# Patient Record
Sex: FEMALE | Race: WHITE | NOT HISPANIC OR LATINO | Employment: UNEMPLOYED | URBAN - NONMETROPOLITAN AREA
[De-identification: names, ages, dates, MRNs, and addresses within clinical notes are randomized per-mention and may not be internally consistent; named-entity substitution may affect disease eponyms.]

---

## 2017-05-02 ENCOUNTER — TRANSCRIBE ORDERS (OUTPATIENT)
Dept: ADMINISTRATIVE | Facility: HOSPITAL | Age: 42
End: 2017-05-02

## 2017-05-02 ENCOUNTER — HOSPITAL ENCOUNTER (OUTPATIENT)
Dept: MAMMOGRAPHY | Facility: HOSPITAL | Age: 42
Discharge: HOME | End: 2017-05-02
Attending: OBSTETRICS & GYNECOLOGY | Admitting: OBSTETRICS & GYNECOLOGY

## 2017-05-02 DIAGNOSIS — Z12.31 ENCOUNTER FOR SCREENING MAMMOGRAM FOR MALIGNANT NEOPLASM OF BREAST: Primary | ICD-10-CM

## 2017-05-02 DIAGNOSIS — Z12.31 ENCOUNTER FOR SCREENING MAMMOGRAM FOR MALIGNANT NEOPLASM OF BREAST: ICD-10-CM

## 2017-05-02 PROCEDURE — G0202 SCR MAMMO BI INCL CAD: HCPCS

## 2017-05-02 PROCEDURE — 77063 BREAST TOMOSYNTHESIS BI: CPT

## 2017-05-03 ENCOUNTER — TRANSCRIBE ORDERS (OUTPATIENT)
Dept: ADMINISTRATIVE | Facility: HOSPITAL | Age: 42
End: 2017-05-03

## 2017-05-03 DIAGNOSIS — N63.0 BREAST NODULE: Primary | ICD-10-CM

## 2017-05-05 ENCOUNTER — APPOINTMENT (OUTPATIENT)
Dept: ULTRASOUND IMAGING | Facility: HOSPITAL | Age: 42
End: 2017-05-05
Attending: OBSTETRICS & GYNECOLOGY

## 2017-05-09 ENCOUNTER — HOSPITAL ENCOUNTER (OUTPATIENT)
Dept: ULTRASOUND IMAGING | Facility: HOSPITAL | Age: 42
Discharge: HOME | End: 2017-05-09
Attending: OBSTETRICS & GYNECOLOGY | Admitting: OBSTETRICS & GYNECOLOGY

## 2017-05-09 DIAGNOSIS — N63.0 BREAST NODULE: ICD-10-CM

## 2017-05-09 PROCEDURE — 76642 ULTRASOUND BREAST LIMITED: CPT

## 2020-08-14 ENCOUNTER — APPOINTMENT (OUTPATIENT)
Dept: CT IMAGING | Facility: HOSPITAL | Age: 45
End: 2020-08-14

## 2020-08-14 ENCOUNTER — APPOINTMENT (OUTPATIENT)
Dept: GENERAL RADIOLOGY | Facility: HOSPITAL | Age: 45
End: 2020-08-14

## 2020-08-14 ENCOUNTER — HOSPITAL ENCOUNTER (INPATIENT)
Facility: HOSPITAL | Age: 45
LOS: 2 days | Discharge: HOME | End: 2020-08-16
Attending: EMERGENCY MEDICINE | Admitting: ORTHOPAEDIC SURGERY

## 2020-08-14 DIAGNOSIS — S82.392A CLOSED INTRA-ARTICULAR FRACTURE OF DISTAL TIBIA, LEFT, INITIAL ENCOUNTER: ICD-10-CM

## 2020-08-14 DIAGNOSIS — S82.301A CLOSED FRACTURE OF DISTAL END OF RIGHT TIBIA, UNSPECIFIED FRACTURE MORPHOLOGY, INITIAL ENCOUNTER: ICD-10-CM

## 2020-08-14 DIAGNOSIS — Z78.9 DECREASED ACTIVITIES OF DAILY LIVING (ADL): ICD-10-CM

## 2020-08-14 DIAGNOSIS — S82.841A CLOSED BIMALLEOLAR FRACTURE OF RIGHT ANKLE, INITIAL ENCOUNTER: Primary | ICD-10-CM

## 2020-08-14 DIAGNOSIS — Z74.09 IMPAIRED MOBILITY: ICD-10-CM

## 2020-08-14 LAB
ANION GAP SERPL CALCULATED.3IONS-SCNC: 13 MMOL/L (ref 5–15)
APTT PPP: 28.9 SECONDS (ref 24.1–35)
BASOPHILS # BLD AUTO: 0.07 10*3/MM3 (ref 0–0.2)
BASOPHILS NFR BLD AUTO: 0.7 % (ref 0–1.5)
BUN SERPL-MCNC: 10 MG/DL (ref 6–20)
BUN/CREAT SERPL: 12.5 (ref 7–25)
CALCIUM SPEC-SCNC: 9.5 MG/DL (ref 8.6–10.5)
CHLORIDE SERPL-SCNC: 100 MMOL/L (ref 98–107)
CO2 SERPL-SCNC: 26 MMOL/L (ref 22–29)
CREAT SERPL-MCNC: 0.8 MG/DL (ref 0.57–1)
DEPRECATED RDW RBC AUTO: 45.2 FL (ref 37–54)
EOSINOPHIL # BLD AUTO: 0.02 10*3/MM3 (ref 0–0.4)
EOSINOPHIL NFR BLD AUTO: 0.2 % (ref 0.3–6.2)
ERYTHROCYTE [DISTWIDTH] IN BLOOD BY AUTOMATED COUNT: 13.9 % (ref 12.3–15.4)
GFR SERPL CREATININE-BSD FRML MDRD: 78 ML/MIN/1.73
GLUCOSE SERPL-MCNC: 140 MG/DL (ref 65–99)
HCT VFR BLD AUTO: 37.1 % (ref 34–46.6)
HGB BLD-MCNC: 12.6 G/DL (ref 12–15.9)
IMM GRANULOCYTES # BLD AUTO: 0.02 10*3/MM3 (ref 0–0.05)
IMM GRANULOCYTES NFR BLD AUTO: 0.2 % (ref 0–0.5)
INR PPP: 1.08 (ref 0.91–1.09)
LYMPHOCYTES # BLD AUTO: 1.21 10*3/MM3 (ref 0.7–3.1)
LYMPHOCYTES NFR BLD AUTO: 12.5 % (ref 19.6–45.3)
MCH RBC QN AUTO: 29.9 PG (ref 26.6–33)
MCHC RBC AUTO-ENTMCNC: 34 G/DL (ref 31.5–35.7)
MCV RBC AUTO: 88.1 FL (ref 79–97)
MONOCYTES # BLD AUTO: 0.9 10*3/MM3 (ref 0.1–0.9)
MONOCYTES NFR BLD AUTO: 9.3 % (ref 5–12)
NEUTROPHILS NFR BLD AUTO: 7.48 10*3/MM3 (ref 1.7–7)
NEUTROPHILS NFR BLD AUTO: 77.1 % (ref 42.7–76)
NRBC BLD AUTO-RTO: 0 /100 WBC (ref 0–0.2)
PLATELET # BLD AUTO: 244 10*3/MM3 (ref 140–450)
PMV BLD AUTO: 11.7 FL (ref 6–12)
POTASSIUM SERPL-SCNC: 3.7 MMOL/L (ref 3.5–5.2)
PROTHROMBIN TIME: 13.6 SECONDS (ref 11.9–14.6)
RBC # BLD AUTO: 4.21 10*6/MM3 (ref 3.77–5.28)
SARS-COV-2 RDRP RESP QL NAA+PROBE: NOT DETECTED
SODIUM SERPL-SCNC: 139 MMOL/L (ref 136–145)
WBC # BLD AUTO: 9.7 10*3/MM3 (ref 3.4–10.8)

## 2020-08-14 PROCEDURE — 87635 SARS-COV-2 COVID-19 AMP PRB: CPT | Performed by: EMERGENCY MEDICINE

## 2020-08-14 PROCEDURE — 85730 THROMBOPLASTIN TIME PARTIAL: CPT | Performed by: EMERGENCY MEDICINE

## 2020-08-14 PROCEDURE — 85025 COMPLETE CBC W/AUTO DIFF WBC: CPT | Performed by: EMERGENCY MEDICINE

## 2020-08-14 PROCEDURE — 73610 X-RAY EXAM OF ANKLE: CPT

## 2020-08-14 PROCEDURE — 97116 GAIT TRAINING THERAPY: CPT

## 2020-08-14 PROCEDURE — 25010000002 MORPHINE PER 10 MG: Performed by: EMERGENCY MEDICINE

## 2020-08-14 PROCEDURE — 99284 EMERGENCY DEPT VISIT MOD MDM: CPT

## 2020-08-14 PROCEDURE — 25010000002 ONDANSETRON PER 1 MG: Performed by: EMERGENCY MEDICINE

## 2020-08-14 PROCEDURE — 97165 OT EVAL LOW COMPLEX 30 MIN: CPT

## 2020-08-14 PROCEDURE — 73590 X-RAY EXAM OF LOWER LEG: CPT

## 2020-08-14 PROCEDURE — 80048 BASIC METABOLIC PNL TOTAL CA: CPT | Performed by: EMERGENCY MEDICINE

## 2020-08-14 PROCEDURE — 25010000003 HYDROMORPHONE 1 MG/ML SOLUTION: Performed by: EMERGENCY MEDICINE

## 2020-08-14 PROCEDURE — 63710000001 PROMETHAZINE PER 25 MG: Performed by: PHYSICIAN ASSISTANT

## 2020-08-14 PROCEDURE — 73700 CT LOWER EXTREMITY W/O DYE: CPT

## 2020-08-14 PROCEDURE — 97161 PT EVAL LOW COMPLEX 20 MIN: CPT | Performed by: PHYSICAL THERAPIST

## 2020-08-14 PROCEDURE — 85610 PROTHROMBIN TIME: CPT | Performed by: EMERGENCY MEDICINE

## 2020-08-14 RX ORDER — CLONIDINE HYDROCHLORIDE 0.1 MG/1
0.1 TABLET ORAL NIGHTLY
COMMUNITY

## 2020-08-14 RX ORDER — LEVOTHYROXINE AND LIOTHYRONINE 38; 9 UG/1; UG/1
60 TABLET ORAL NIGHTLY
COMMUNITY

## 2020-08-14 RX ORDER — RISPERIDONE 2 MG/1
2 TABLET ORAL NIGHTLY
COMMUNITY

## 2020-08-14 RX ORDER — PROMETHAZINE HYDROCHLORIDE 25 MG/1
12.5 TABLET ORAL EVERY 6 HOURS PRN
Status: DISCONTINUED | OUTPATIENT
Start: 2020-08-14 | End: 2020-08-16 | Stop reason: HOSPADM

## 2020-08-14 RX ORDER — DULOXETIN HYDROCHLORIDE 60 MG/1
60 CAPSULE, DELAYED RELEASE ORAL NIGHTLY
COMMUNITY

## 2020-08-14 RX ORDER — DICLOFENAC SODIUM AND MISOPROSTOL 75; 200 MG/1; UG/1
1 TABLET, DELAYED RELEASE ORAL NIGHTLY
COMMUNITY

## 2020-08-14 RX ORDER — ONDANSETRON 2 MG/ML
4 INJECTION INTRAMUSCULAR; INTRAVENOUS ONCE
Status: COMPLETED | OUTPATIENT
Start: 2020-08-14 | End: 2020-08-14

## 2020-08-14 RX ORDER — MORPHINE SULFATE 10 MG/ML
6 INJECTION INTRAMUSCULAR; INTRAVENOUS; SUBCUTANEOUS EVERY 4 HOURS PRN
Status: COMPLETED | OUTPATIENT
Start: 2020-08-14 | End: 2020-08-14

## 2020-08-14 RX ORDER — ONDANSETRON 2 MG/ML
4 INJECTION INTRAMUSCULAR; INTRAVENOUS EVERY 6 HOURS PRN
Status: DISCONTINUED | OUTPATIENT
Start: 2020-08-14 | End: 2020-08-16 | Stop reason: HOSPADM

## 2020-08-14 RX ORDER — METOPROLOL TARTRATE 50 MG/1
50 TABLET, FILM COATED ORAL NIGHTLY
COMMUNITY

## 2020-08-14 RX ORDER — NALOXONE HCL 0.4 MG/ML
0.4 VIAL (ML) INJECTION
Status: DISCONTINUED | OUTPATIENT
Start: 2020-08-14 | End: 2020-08-16 | Stop reason: HOSPADM

## 2020-08-14 RX ORDER — OLANZAPINE 20 MG/1
20 TABLET ORAL NIGHTLY
COMMUNITY

## 2020-08-14 RX ORDER — SODIUM CHLORIDE, SODIUM LACTATE, POTASSIUM CHLORIDE, CALCIUM CHLORIDE 600; 310; 30; 20 MG/100ML; MG/100ML; MG/100ML; MG/100ML
100 INJECTION, SOLUTION INTRAVENOUS CONTINUOUS
Status: DISCONTINUED | OUTPATIENT
Start: 2020-08-14 | End: 2020-08-16 | Stop reason: HOSPADM

## 2020-08-14 RX ORDER — CYCLOBENZAPRINE HCL 10 MG
10 TABLET ORAL NIGHTLY
COMMUNITY

## 2020-08-14 RX ORDER — OXYCODONE AND ACETAMINOPHEN 7.5; 325 MG/1; MG/1
1 TABLET ORAL EVERY 4 HOURS PRN
Status: DISCONTINUED | OUTPATIENT
Start: 2020-08-14 | End: 2020-08-16 | Stop reason: HOSPADM

## 2020-08-14 RX ADMIN — PROMETHAZINE HYDROCHLORIDE 12.5 MG: 25 TABLET ORAL at 15:16

## 2020-08-14 RX ADMIN — ONDANSETRON HYDROCHLORIDE 4 MG: 2 SOLUTION INTRAMUSCULAR; INTRAVENOUS at 08:09

## 2020-08-14 RX ADMIN — SODIUM CHLORIDE, POTASSIUM CHLORIDE, SODIUM LACTATE AND CALCIUM CHLORIDE 100 ML/HR: 600; 310; 30; 20 INJECTION, SOLUTION INTRAVENOUS at 08:09

## 2020-08-14 RX ADMIN — HYDROMORPHONE HYDROCHLORIDE 1 MG: 1 INJECTION, SOLUTION INTRAMUSCULAR; INTRAVENOUS; SUBCUTANEOUS at 03:32

## 2020-08-14 RX ADMIN — OXYCODONE HYDROCHLORIDE AND ACETAMINOPHEN 1 TABLET: 7.5; 325 TABLET ORAL at 15:16

## 2020-08-14 RX ADMIN — MORPHINE SULFATE 6 MG: 10 INJECTION, SOLUTION INTRAMUSCULAR; INTRAVENOUS at 08:09

## 2020-08-14 RX ADMIN — PROMETHAZINE HYDROCHLORIDE 12.5 MG: 25 TABLET ORAL at 21:25

## 2020-08-14 RX ADMIN — ONDANSETRON HYDROCHLORIDE 4 MG: 2 SOLUTION INTRAMUSCULAR; INTRAVENOUS at 16:48

## 2020-08-14 RX ADMIN — ONDANSETRON HYDROCHLORIDE 4 MG: 2 SOLUTION INTRAMUSCULAR; INTRAVENOUS at 03:32

## 2020-08-14 RX ADMIN — MORPHINE SULFATE 6 MG: 10 INJECTION, SOLUTION INTRAMUSCULAR; INTRAVENOUS at 21:25
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
## 2020-08-14 NOTE — THERAPY TREATMENT NOTE
Acute Care - Physical Therapy Treatment Note  [facility]     Patient Name: Hilda Cantrell  : 1975  MRN: [id number]  Today's Date: 2020  Onset of Illness/Injury or Date of Surgery: 20     Referring Physician: Dr. Lewis    Admit Date: 2020    Visit Dx:    ICD-10-CM ICD-9-CM   1. Closed bimalleolar fracture of right ankle, initial encounter S82.841A 824.4   2. Closed fracture of distal end of right tibia, unspecified fracture morphology, initial encounter S82.301A 824.8   3. Impaired mobility Z74.09 799.89   4. Decreased activities of daily living (ADL) Z78.9 V49.89     Patient Active Problem List   Diagnosis   • Closed bimalleolar fracture of right ankle   • Closed intra-articular fracture of distal tibia, left, initial encounter       Therapy Treatment    Rehabilitation Treatment Summary     Row Name 20 1413             Treatment Time/Intention    Discipline  physical therapy assistant  -      Document Type  therapy note (daily note)  -      Subjective Information  complains of;pain  -LC2      Mode of Treatment  physical therapy  -      Existing Precautions/Restrictions  fall;non-weight bearing NWB R LE  -LC2      Recorded by [LC] Eun Lindquist, PTA 20 1413  [LC2] Eun Lindquist, PTA 20 1451      Row Name 20 1413             Bed Mobility Assessment/Treatment    Supine-Sit [place] (Bed Mobility)  independent  -      Sit-Supine [place] (Bed Mobility)  independent  -LC      Recorded by [LC] Eun Lindquist, PTA 20 1451      Row Name 20 1413             Sit-Stand Transfer    Sit-Stand [place] (Transfers)  supervision  -      Assistive Device (Sit-Stand Transfers)  walker, front-wheeled  -      Recorded by [LC] Eun Lindquist, PTA 20 1451      Row Name 20 1413             Stand-Sit Transfer    Stand-Sit [place] (Transfers)  supervision  -      Assistive Device (Stand-Sit Transfers)  walker, front-wheeled  -       Recorded by [LC] Eun Lindquist, [facility] 08/14/20 1451      Row Name 08/14/20 1413             Gait/Stairs Assessment/Training    [place] Level (Gait)  verbal cues;contact guard  -      Assistive Device (Gait)  walker, front-wheeled  -LC      Distance in Feet (Gait)  15' hopping, unable to clear L foot enough to attempt at this time  -LC      Recorded by [LC] Eun Lindquist, [facility] 08/14/20 1451      Row Name 08/14/20 1413             Motor Skills Assessment/Interventions    Additional Documentation  Therapeutic Exercise (Group);Therapeutic Exercise Interventions (Group)  -LC      Recorded by [LC] Eun Lindquist, [facility] 08/14/20 1451      Row Name 08/14/20 1413             Therapeutic Exercise    Comment (Therapeutic Exercise)  able to complete 8 single leg squats with RWX  -LC      Recorded by [LC] Eun Lindquist, [facility] 08/14/20 1451      Row Name 08/14/20 1413             Positioning and Restraints    Pre-Treatment Position  in bed  -LC      Post Treatment Position  bed  -LC      In Bed  fowlers;call light within reach;encouraged to call for assist  -LC      Recorded by [LC] Eun Lindquist, [facility] 08/14/20 1451      Row Name 08/14/20 1413             Pain Scale: Numbers Pre/Post-Treatment    Pain Scale: Numbers, Pretreatment  7/10  -LC      Pain Scale: Numbers, Post-Treatment  7/10  -LC      Pain Location - Side  Right  -LC      Pain Location  ankle  -LC      Pain Intervention(s)  Medication (See MAR);Ambulation/increased activity  -      Recorded by [LC] Eun Lindquist, [facility] 08/14/20 1451        User Key  (r) = Recorded By, (t) = Taken By, (c) = Cosigned By    Initials Name Effective Dates Discipline     Eun Lindquist, [facility] 10/18/19 -  PT               Rehab Goal Summary     Row Name 08/14/20 1248 08/14/20 1110          Transfer Goal 1 (PT)    Activity/Assistive Device (Transfer Goal 1, PT)  sit-to-stand/stand-to-sit;bed-to-chair/chair-to-bed;walker, rolling  -MS  --     [place] Level/Cues Needed (Transfer Goal 1, PT)   conditional independence  -MS  --     Time Frame (Transfer Goal 1, PT)  long term goal (LTG);by discharge  -MS  --     Progress/Outcome (Transfer Goal 1, PT)  goal ongoing  -MS  --        Gait Training Goal 1 (PT)    Activity/Assistive Device (Gait Training Goal 1, PT)  gait (walking locomotion);assistive device use;decrease fall risk;increase endurance/gait distance;walker, rolling  -MS  --     [place] Level (Gait Training Goal 1, PT)  conditional independence  -MS  --     Distance (Gait Goal 1, PT)  100ft  -MS  --     Time Frame (Gait Training Goal 1, PT)  long term goal (LTG);by discharge  -MS  --     Progress/Outcome (Gait Training Goal 1, PT)  goal ongoing  -MS  --        Stairs Goal 1 (PT)    Activity/Assistive Device (Stairs Goal 1, PT)  ascending stairs;descending stairs;step-to-step;walker, rolling;crutches, axillary  -MS  --     [place] Level/Cues Needed (Stairs Goal 1, PT)  minimum assist (75% or more patient effort);tactile cues required;verbal cues required  -MS  --     Number of Stairs (Stairs Goal 1, PT)  4  -MS  --     Time Frame (Stairs Goal 1, PT)  long term goal (LTG);by discharge  -MS  --     Progress/Outcome (Stairs Goal 1, PT)  goal ongoing  -MS  --        Occupational Therapy Goals    Transfer Goal Selection (OT)  --  transfer, OT goal 1  -MW     Dressing Goal Selection (OT)  --  dressing, OT goal 1  -MW     Toileting Goal Selection (OT)  --  toileting, OT goal 1  -MW        Transfer Goal 1 (OT)    Activity/Assistive Device (Transfer Goal 1, OT)  --  sit-to-stand/stand-to-sit;bed-to-chair/chair-to-bed;toilet;shower chair  -MW     [place] Level/Cues Needed (Transfer Goal 1, OT)  --  conditional independence  -MW     Time Frame (Transfer Goal 1, OT)  --  long term goal (LTG);by discharge  -MW     Progress/Outcome (Transfer Goal 1, OT)  --  goal ongoing  -MW        Dressing Goal 1 (OT)    Activity/Assistive Device (Dressing Goal 1, OT)  --  lower body dressing  -MW      [place]/Cues Needed (Dressing Goal 1, OT)  --  minimum assist (75% or more patient effort)  -MW     Time Frame (Dressing Goal 1, OT)  --  long term goal (LTG);by discharge  -MW     Progress/Outcome (Dressing Goal 1, OT)  --  goal ongoing  -MW        Toileting Goal 1 (OT)    Activity/Device (Toileting Goal 1, OT)  --  toileting skills, all;commode  -MW     [place] Level/Cues Needed (Toileting Goal 1, OT)  --  minimum assist (75% or more patient effort)  -MW     Time Frame (Toileting Goal 1, OT)  --  long term goal (LTG);by discharge  -MW     Progress/Outcome (Toileting Goal 1, OT)  --  goal ongoing  -MW       User Key  (r) = Recorded By, (t) = Taken By, (c) = Cosigned By    Initials Name Provider Type Discipline    Stephanie Sanches, PT, DPT, NCS Physical Therapist PT    Neva Fatima, OTR/L Occupational Therapist OT          Physical Therapy Education                 Title: PT OT SLP Therapies (In Progress)     Topic: Physical Therapy (In Progress)     Point: Mobility training (Done)     Description:   Instruct learner(s) on safety and technique for assisting patient out of bed, chair or wheelchair.  Instruct in the proper use of assistive devices, such as walker, crutches, cane or brace.              Patient Friendly Description:   It's important to get you on your feet again, but we need to do so in a way that is safe for you. Falling has serious consequences, and your personal safety is the most important thing of all.        When it's time to get out of bed, one of us or a family member will sit next to you on the bed to give you support.     If your doctor or nurse tells you to use a walker, crutches, a cane, or a brace, be sure you use it every time you get out of bed, even if you think you don't need it.    Learning Progress Summary           Patient Acceptance, E, VU by MS at 8/14/2020 0904    Comment:  role of PT in her care, gait mechanics                   Point: Home exercise program  (Not Started)     Description:   Instruct learner(s) on appropriate technique for monitoring, assisting and/or progressing patient with therapeutic exercises and activities.              Learner Progress:   Not documented in this visit.          Point: Body mechanics (Not Started)     Description:   Instruct learner(s) on proper positioning and spine alignment for patient and/or caregiver during mobility tasks and/or exercises.              Learner Progress:   Not documented in this visit.          Point: Precautions (Done)     Description:   Instruct learner(s) on prescribed precautions during mobility and gait tasks              Learning Progress Summary           Patient Acceptance, E, VU by MS at 8/14/2020 1259    Comment:  role of PT in her care, gait mechanics                               User Key     Initials Effective Dates Name Provider Type Discipline    MS 06/19/18 -  Stephanie Broussard, PT, DPT, NCS Physical Therapist PT                PT Recommendation and Plan        Outcome Measures     Row Name 08/14/20 1300             How much help from another is currently needed...    Putting on and taking off regular lower body clothing?  2  -MW      Bathing (including washing, rinsing, and drying)  2  -MW      Toileting (which includes using toilet bed pan or urinal)  2  -MW      Putting on and taking off regular upper body clothing  4  -MW      Taking care of personal grooming (such as brushing teeth)  4  -MW      Eating meals  4  -MW      AM-PAC 6 Clicks Score (OT)  18  -MW         Functional Assessment    Outcome Measure Options  AM-PAC 6 Clicks Daily Activity (OT)  -MW        User Key  (r) = Recorded By, (t) = Taken By, (c) = Cosigned By    Initials Name Provider Type    MW Neva Schneider, OTR/L Occupational Therapist         Time Calculation:   PT Charges     Row Name 08/14/20 1451 08/14/20 1249          Time Calculation    Start Time  1413  -LC  1050 5 min chart review  -MS     Stop Time  1440  -LC  1125   -MS     Time Calculation (min)  27 min  -LC  35 min  -MS     PT Received On  --  08/14/20  -MS     PT Goal Re-Cert Due Date  --  08/24/20  -MS        Time Calculation- PT    Total Timed Code Minutes- PT  27 minute(s)  -LC  --       User Key  (r) = Recorded By, (t) = Taken By, (c) = Cosigned By    Initials Name Provider Type    MS Bobo Stephanie R, PT, DPT, NCS Physical Therapist    Eun Peraza PTA Physical Therapy Assistant        Therapy Charges for Today     Code Description Service Date Service Provider Modifiers Qty    [id number] HC GAIT TRAINING EA 15 MIN 8/14/2020 Eun Lindquist PTA GP 2          PT G-Codes  Outcome Measure Options: AM-PAC 6 Clicks Daily Activity (OT)  AM-PAC 6 Clicks Score (PT): 18  AM-PAC 6 Clicks Score (OT): 18    Eun Lindquist PTA  8/14/2020
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
## 2020-08-14 NOTE — PLAN OF CARE
Problem: Patient Care Overview  Goal: Plan of Care Review  Outcome: Ongoing (interventions implemented as appropriate)  Flowsheets (Taken 8/14/2020 1122)  Progress: no change  Plan of Care Reviewed With: patient  Outcome Summary: PT evaluation completed. The patient presents alert and oriented x4. She reports that she was discharged home from an outside facility, but once she got home, she could not get inside her home due to the 4 steps with no handrails. She is currently is having difficulty with hopping with a walker. She was unabe to safely clear her L foot 25% of the time when walking only 20ft. .She is not strong enough to hope up a 6 in step at this time even with UE support. She will benefit from skilled PT services to work on strengthening, balance, gait training, and stair training. Recommend inpt acute rehab or SNF placement.

## 2020-08-14 NOTE — PLAN OF CARE
Problem: Patient Care Overview  Goal: Plan of Care Review  8/14/2020 1326 by Neva Schneider, OTR/L  Outcome: Ongoing (interventions implemented as appropriate)  Flowsheets (Taken 8/14/2020 1326)  Progress: no change  Plan of Care Reviewed With: patient  Outcome Summary: OT eval completed. Pt awake and alert c/o 6/10 pain in her ankle. Performs all bed mobility with S. Stands S to CGA with use of RW and vc for handplacement for increased safety. Hops from bed to door and back with CGA. Pt fatigues quickly demoing decreased strength and endurance needed for adequate fxl mobility, ADL/IADL, and steps for safely entering/exiting home. OT indicated to address stated deficits to increase independence and decrease fall risk. Recommend d/c inpatient rehab vs SNF.

## 2020-08-14 NOTE — NURSING NOTE
Pt arrived to floor from ER A/Ox4. Pt able to wiggle toes on right foot. C/o pain rating a 5 out of 10. Ice provided to Left shoulder and Right ankle.

## 2020-08-14 NOTE — PLAN OF CARE
Pt A&Ox4. VSS. C/o pain this shift, medicated with PRN with relief. Up x1 to BSC. Voiding. Possible DC tomorrow for [place]. Safety maintained, will continue to monitor.   Problem: Patient Care Overview  Goal: Individualization and Mutuality  Outcome: Ongoing (interventions implemented as appropriate)  Flowsheets (Taken 8/14/2020 3342)  Patient Specific Goals (Include Timeframe): decrease pain by DC  Patient Specific Interventions: up x1 to BSC  Patient Specific Preferences: Likes to drink sprite for nausea
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
## 2020-08-15 PROCEDURE — 97530 THERAPEUTIC ACTIVITIES: CPT

## 2020-08-15 PROCEDURE — 97535 SELF CARE MNGMENT TRAINING: CPT

## 2020-08-15 PROCEDURE — 63710000001 PROMETHAZINE PER 25 MG: Performed by: PHYSICIAN ASSISTANT

## 2020-08-15 PROCEDURE — 25010000002 ONDANSETRON PER 1 MG: Performed by: EMERGENCY MEDICINE

## 2020-08-15 RX ORDER — PROMETHAZINE HYDROCHLORIDE 12.5 MG/1
12.5 TABLET ORAL EVERY 6 HOURS PRN
Qty: 21 TABLET | Refills: 0 | Status: SHIPPED | OUTPATIENT
Start: 2020-08-15 | End: 2020-08-22

## 2020-08-15 RX ORDER — ONDANSETRON 4 MG/1
4 TABLET, FILM COATED ORAL EVERY 6 HOURS PRN
Qty: 28 TABLET | Refills: 0 | Status: SHIPPED | OUTPATIENT
Start: 2020-08-15 | End: 2020-08-22

## 2020-08-15 RX ORDER — POLYETHYLENE GLYCOL 3350 17 G/17G
17 POWDER, FOR SOLUTION ORAL 2 TIMES DAILY
Qty: 14 PACKET | Refills: 0 | Status: SHIPPED | OUTPATIENT
Start: 2020-08-15 | End: 2020-08-22

## 2020-08-15 RX ADMIN — ONDANSETRON HYDROCHLORIDE 4 MG: 2 SOLUTION INTRAMUSCULAR; INTRAVENOUS at 03:05

## 2020-08-15 RX ADMIN — PROMETHAZINE HYDROCHLORIDE 12.5 MG: 25 TABLET ORAL at 07:48

## 2020-08-15 RX ADMIN — OXYCODONE HYDROCHLORIDE AND ACETAMINOPHEN 1 TABLET: 7.5; 325 TABLET ORAL at 03:23

## 2020-08-15 RX ADMIN — OXYCODONE HYDROCHLORIDE AND ACETAMINOPHEN 1 TABLET: 7.5; 325 TABLET ORAL at 19:20

## 2020-08-15 RX ADMIN — ONDANSETRON HYDROCHLORIDE 4 MG: 2 SOLUTION INTRAMUSCULAR; INTRAVENOUS at 12:33

## 2020-08-15 RX ADMIN — OXYCODONE HYDROCHLORIDE AND ACETAMINOPHEN 1 TABLET: 7.5; 325 TABLET ORAL at 12:58

## 2020-08-15 RX ADMIN — OXYCODONE HYDROCHLORIDE AND ACETAMINOPHEN 1 TABLET: 7.5; 325 TABLET ORAL at 08:11

## 2020-08-15 RX ADMIN — ONDANSETRON HYDROCHLORIDE 4 MG: 2 SOLUTION INTRAMUSCULAR; INTRAVENOUS at 18:45

## 2020-08-15 NOTE — PROGRESS NOTES
Continued Stay Note   [place]     Patient Name: Hilda Cantrell  MRN: [id number]  Today's Date: 8/15/2020    Admit Date: 8/14/2020    Discharge Plan     Row Name 08/15/20 0923       Plan    Plan  Home    Patient/Family in Agreement with Plan  yes    Final Discharge Disposition Code  01 - home or self-care    Final Note  Pt has someone building a ramp for her so she has decided she wants to go home instead of [facility]. There are orders for a standard w/c. Spoke with her about this. Checked with [employer] but they will not deliver on the weekends. Orders sent to [facility] [employer] at [phone] and spoke with Migue [phone]. He will deliver to pt's home per her request.         Discharge Codes    No documentation.       Expected Discharge Date and Time     Expected Discharge Date Expected Discharge Time    Aug 15, 2020             COLTON Dalal
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
## 2020-08-15 NOTE — PLAN OF CARE
PRN Morphine and Percocet given for pain, premedicated w/ Phenergan or Zofran prior due to pt. report of nausea w/ pain medication. Tachycardic, HR increases into 120s-130s at times when pt. is in pain (stays in 110s when resting). BP stable, on RA. Pivoting to BSC well. RLE DP pulse palpable, cap. refill <3 sec. ACE wrap in place, RLE kept elevated.   Problem: Patient Care Overview  Goal: Plan of Care Review  Outcome: Ongoing (interventions implemented as appropriate)  Flowsheets (Taken 8/15/2020 7842)  Progress: improving  Plan of Care Reviewed With: patient

## 2020-08-15 NOTE — THERAPY TREATMENT NOTE
"Acute Care - Occupational Therapy Treatment Note  [facility]     Patient Name: Hilda Cantrell  : 1975  MRN: [id number]  Today's Date: 8/15/2020  Onset of Illness/Injury or Date of Surgery: 20  Date of Referral to OT: 20  Referring Physician: Dr. Lewis    Admit Date: 2020       ICD-10-CM ICD-9-CM   1. Closed bimalleolar fracture of right ankle, initial encounter S82.841A 824.4   2. Closed fracture of distal end of right tibia, unspecified fracture morphology, initial encounter S82.301A 824.8   3. Impaired mobility Z74.09 799.89   4. Decreased activities of daily living (ADL) Z78.9 V49.89   5. Closed intra-articular fracture of distal tibia, left, initial encounter S82.392A 824.8     Patient Active Problem List   Diagnosis   • Closed bimalleolar fracture of right ankle   • Closed intra-articular fracture of distal tibia, left, initial encounter     History reviewed. No pertinent past medical history.  History reviewed. No pertinent surgical history.    Therapy Treatment    Rehabilitation Treatment Summary     Row Name 08/15/20 0858 08/15/20 0828          Treatment Time/Intention    Discipline  occupational therapy assistant  -MT  physical therapy assistant  -NW     Document Type  therapy note (daily note)  -MT  therapy note (daily note)  -NW     Patient/Family Observations  one family member   -MT  --     Comment  (S) Pt educated on home safety with bathing and dressing (bagging R leg for bathing to prevent from getting wet), seated task to prevent fall. Educated on clothing options to make pt more I with LB dressing. t/f safety with use of RW d/t w/c not being able to fit in BR. Pt stated she \"feels good about it now\"  -MT  (S) Pt being d/c Home today, reviewed w/ pt home safety, w/c use, ramp, car transfer and answered any other questions pt may have had. Pt had no other concerns at this time just wants to make sure w/c is here at d/c or pt will not be safe for home.  -NW2     Existing " Precautions/Restrictions  fall;non-weight bearing RLE   -MT  --     Treatment Considerations/Comments  (S) tx performed from 8:50-9:04  -MT  --     Recorded by [MT] Vidya Castaneda COTA/L 08/15/20 0907 [NW] Ann-Marie Leal, PTA 08/15/20 0828  [NW2] Ann-Marie Leal, PTA 08/15/20 0841     Row Name 08/15/20 0858             Cognitive Assessment/Intervention- PT/OT    Personal Safety Interventions  supervised activity  -MT      Recorded by [MT] Vidya Castaneda COTA/L 08/15/20 0907      Row Name 08/15/20 0858             Mobility Assessment/Intervention    Extremity Weight-bearing Status  right lower extremity  -MT      Right Lower Extremity (Weight-bearing Status)  non weight-bearing (NWB)  -MT      Recorded by [MT] Vidya Castaneda COTA/L 08/15/20 0907      Row Name 08/15/20 0858             Therapeutic Exercise    Comment (Therapeutic Exercise)  educated on sit <>stands throughout the day to strengthen LLE for mobility with RW   -MT      Recorded by [MT] Vidya Castaneda COTA/L 08/15/20 0907      Row Name 08/15/20 0858             Positioning and Restraints    Pre-Treatment Position  in bed  -MT      Post Treatment Position  bed  -MT      In Bed  fowlers;call light within reach;encouraged to call for assist;with family/caregiver;side rails up x2  -MT      Recorded by [MT] Vidya Castaneda COTA/L 08/15/20 0907      Row Name 08/15/20 0858             Pain Scale: Numbers Pre/Post-Treatment    Pain Scale: Numbers, Pretreatment  5/10  -MT      Pain Scale: Numbers, Post-Treatment  5/10  -MT      Pain Location - Side  Right  -MT      Pain Location  ankle  -MT      Recorded by [MT] Vidya Castaneda COTA/L 08/15/20 0907      Row Name 08/15/20 0858             Outcome Summary/Treatment Plan (OT)    Daily Summary of Progress (OT)  progress toward functional goals is good  -MT      Barriers to Overall Progress (OT)  RLE NWB, pain with movement (per pt report)  -MT      Recorded by [MT] Vidya Castaneda COTA/PAIGE 08/15/20 0907         User Key  (r) = Recorded By, (t) = Taken By, (c) = Cosigned By    Initials Name Effective Dates Discipline    NW Ann-Marie Leal PTA 08/02/16 -  PT    Vidya Moreland COTA/L 11/29/19 -  OT             Occupational Therapy Education                 Title: PT OT SLP Therapies (In Progress)     Topic: Occupational Therapy (Done)     Point: ADL training (Done)     Description:   Instruct learner(s) on proper safety adaptation and remediation techniques during self care or transfers.   Instruct in proper use of assistive devices.              Learning Progress Summary           Patient Acceptance, E,TB, VU by MT at 8/15/2020 0908    Comment:  home safety with t/fs and RLE NWB    Acceptance, E, VU by  at 8/14/2020 1326                   Point: Home exercise program (Done)     Description:   Instruct learner(s) on appropriate technique for monitoring, assisting and/or progressing therapeutic exercises/activities.              Learning Progress Summary           Patient Acceptance, E, VU by  at 8/14/2020 1326                               User Key     Initials Effective Dates Name Provider Type Discipline     08/28/18 -  Neva Schneider, OTR/L Occupational Therapist OT    MT 11/29/19 -  Vidya Castaneda COTA/L Occupational Therapy Assistant OT                OT Recommendation and Plan  Outcome Summary/Treatment Plan (OT)  Daily Summary of Progress (OT): progress toward functional goals is good  Barriers to Overall Progress (OT): RLE NWB, pain with movement (per pt report)  Daily Summary of Progress (OT): progress toward functional goals is good  Plan of Care Review  Plan of Care Reviewed With: patient  Plan of Care Reviewed With: patient  Outcome Summary: Pt educated on home safety with bathing and dressing (bagging R leg for bathing to prevent from getting wet), seated task to prevent fall. Educated on clothing options to make pt more I with LB dressing. t/f safety with use of RW d/t w/c not being able to  "fit in BR. Performing sit<>stands throughout day to increase LLE strength for mobility d/t RLE NWB. Pt stated she \"feels good about it now\"  Outcome Measures     Row Name 08/15/20 0858 08/14/20 1300          How much help from another is currently needed...    Putting on and taking off regular lower body clothing?  2  -MT  2  -MW     Bathing (including washing, rinsing, and drying)  2  -MT  2  -MW     Toileting (which includes using toilet bed pan or urinal)  2  -MT  2  -MW     Putting on and taking off regular upper body clothing  4  -MT  4  -MW     Taking care of personal grooming (such as brushing teeth)  4  -MT  4  -MW     Eating meals  4  -MT  4  -MW     AM-PAC 6 Clicks Score (OT)  18  -MT  18  -MW        Functional Assessment    Outcome Measure Options  --  AM-PAC 6 Clicks Daily Activity (OT)  -MW       User Key  (r) = Recorded By, (t) = Taken By, (c) = Cosigned By    Initials Name Provider Type    Neva Fatima OTR/L Occupational Therapist    Vidya Moreland COTA/L Occupational Therapy Assistant           Time Calculation:   Time Calculation- OT     Row Name 08/15/20 0850             Time Calculation- OT    OT Start Time  0850  -MT      OT Stop Time  0904  -MT      OT Time Calculation (min)  14 min  -MT      Total Timed Code Minutes- OT  14 minute(s)  -MT      OT Received On  08/15/20  -MT         Timed Charges    65067 - OT Self Care/Mgmt Minutes  14  -MT        User Key  (r) = Recorded By, (t) = Taken By, (c) = Cosigned By    Initials Name Provider Type    Vidya Moreland COTA/L Occupational Therapy Assistant        Therapy Charges for Today     Code Description Service Date Service Provider Modifiers Qty    18268829713 HC OT SELF CARE/MGMT/TRAIN EA 15 MIN 8/15/2020 Vidya Castaneda COTA/L GO 1               MARGARITA Watts  8/15/2020  "

## 2020-08-15 NOTE — PLAN OF CARE
"  Problem: Patient Care Overview  Goal: Plan of Care Review  Outcome: Ongoing (interventions implemented as appropriate)  Flowsheets (Taken 8/15/2020 6268)  Progress: improving  Plan of Care Reviewed With: patient  Outcome Summary: Pt educated on home safety with bathing and dressing (bagging R leg for bathing to prevent from getting wet), seated task to prevent fall. Educated on clothing options to make pt more I with LB dressing. t/f safety with use of RW d/t w/c not being able to fit in BR. Performing sit<>stands throughout day to increase LLE strength for mobility d/t RLE NWB. Pt stated she \"feels good about it now\". Recommend D/C home with assist     "

## 2020-08-16 VITALS
TEMPERATURE: 97.9 F | SYSTOLIC BLOOD PRESSURE: 137 MMHG | BODY MASS INDEX: 41.02 KG/M2 | DIASTOLIC BLOOD PRESSURE: 84 MMHG | WEIGHT: 293 LBS | RESPIRATION RATE: 18 BRPM | HEART RATE: 111 BPM | OXYGEN SATURATION: 95 % | HEIGHT: 71 IN

## 2020-08-16 PROCEDURE — 25010000002 ONDANSETRON PER 1 MG: Performed by: EMERGENCY MEDICINE

## 2020-08-16 PROCEDURE — 63710000001 PROMETHAZINE PER 25 MG: Performed by: PHYSICIAN ASSISTANT

## 2020-08-16 RX ADMIN — OXYCODONE HYDROCHLORIDE AND ACETAMINOPHEN 1 TABLET: 7.5; 325 TABLET ORAL at 00:54

## 2020-08-16 RX ADMIN — ONDANSETRON HYDROCHLORIDE 4 MG: 2 SOLUTION INTRAMUSCULAR; INTRAVENOUS at 05:30

## 2020-08-16 RX ADMIN — OXYCODONE HYDROCHLORIDE AND ACETAMINOPHEN 1 TABLET: 7.5; 325 TABLET ORAL at 05:29

## 2020-08-16 RX ADMIN — PROMETHAZINE HYDROCHLORIDE 12.5 MG: 25 TABLET ORAL at 00:05

## 2020-08-16 NOTE — THERAPY DISCHARGE NOTE
Acute Care - Physical Therapy Discharge Summary  [facility]       Patient Name: Hilda Cantrell  : 1975  MRN: [id number]    Today's Date: 2020  Onset of Illness/Injury or Date of Surgery: 20       Referring Physician: Dr. Lewis      Admit Date: 2020      PT Recommendation and Plan    Visit Dx:    ICD-10-CM ICD-9-CM   1. Closed bimalleolar fracture of right ankle, initial encounter S82.841A 824.4   2. Closed fracture of distal end of right tibia, unspecified fracture morphology, initial encounter S82.301A 824.8   3. Impaired mobility Z74.09 799.89   4. Decreased activities of daily living (ADL) Z78.9 V49.89   5. Closed intra-articular fracture of distal tibia, left, initial encounter S82.392A 824.8       Outcome Measures     Row Name 08/15/20 0858 20 1300          How much help from another is currently needed...    Putting on and taking off regular lower body clothing?  2  -MT  2  -MW     Bathing (including washing, rinsing, and drying)  2  -MT  2  -MW     Toileting (which includes using toilet bed pan or urinal)  2  -MT  2  -MW     Putting on and taking off regular upper body clothing  4  -MT  4  -MW     Taking care of personal grooming (such as brushing teeth)  4  -MT  4  -MW     Eating meals  4  -MT  4  -MW     AM-PAC 6 Clicks Score (OT)  18  -MT  18  -MW        Functional Assessment    Outcome Measure Options  --  AM-PAC 6 Clicks Daily Activity (OT)  -MW       User Key  (r) = Recorded By, (t) = Taken By, (c) = Cosigned By    Initials Name Provider Type    Neva Fatima, OTR/L Occupational Therapist    Vidya Moreland COTA/L Occupational Therapy Assistant              Rehab Goal Summary     Row Name 20 1514             Transfer Goal 1 (PT)    Activity/Assistive Device (Transfer Goal 1, PT)  sit-to-stand/stand-to-sit;bed-to-chair/chair-to-bed;walker, rolling  -WK      [place] Level/Cues Needed (Transfer Goal 1, PT)  conditional independence  -WK      Time Frame  (Transfer Goal 1, PT)  long term goal (LTG);by discharge  -WK      Progress/Outcome (Transfer Goal 1, PT)  goal not met  -WK         Gait Training Goal 1 (PT)    Activity/Assistive Device (Gait Training Goal 1, PT)  gait (walking locomotion);assistive device use;decrease fall risk;increase endurance/gait distance;walker, rolling  -WK      [place] Level (Gait Training Goal 1, PT)  conditional independence  -WK      Distance (Gait Goal 1, PT)  100ft  -WK      Time Frame (Gait Training Goal 1, PT)  long term goal (LTG);by discharge  -WK      Progress/Outcome (Gait Training Goal 1, PT)  goal not met  -WK         Stairs Goal 1 (PT)    Activity/Assistive Device (Stairs Goal 1, PT)  ascending stairs;descending stairs;step-to-step;walker, rolling;crutches, axillary  -WK      [place] Level/Cues Needed (Stairs Goal 1, PT)  minimum assist (75% or more patient effort);tactile cues required;verbal cues required  -WK      Number of Stairs (Stairs Goal 1, PT)  4  -WK      Time Frame (Stairs Goal 1, PT)  long term goal (LTG);by discharge  -WK      Progress/Outcome (Stairs Goal 1, PT)  goal not met  -WK        User Key  (r) = Recorded By, (t) = Taken By, (c) = Cosigned By    Initials Name Provider Type Discipline    Nia Latham PTA Physical Therapy Assistant PT              PT Discharge Summary  Anticipated Discharge Disposition (PT): home  Reason for Discharge: Discharge from facility  Outcomes Achieved: Refer to plan of care for updates on goals achieved  Discharge Destination: Home      Nia De Leon PTA   8/16/2020

## 2020-08-16 NOTE — PLAN OF CARE
Percocet given for c/o pain 7/10, rested well after administration. Premedicated w/ anti-nausea med prior to pain medication. VSS. Tachycardic. No neurovascular changes.   Problem: Patient Care Overview  Goal: Plan of Care Review  Outcome: Ongoing (interventions implemented as appropriate)  Flowsheets (Taken 8/16/2020 8473)  Progress: improving  Plan of Care Reviewed With: patient
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
## 2020-08-16 NOTE — PLAN OF CARE
A&Ox4.  VSS.  Pain controlled with ordered pain meds.  Patient was to be discharged today, patient requested to stay the night.  Physician contacted, and said patient could stay.

## 2020-08-17 NOTE — THERAPY DISCHARGE NOTE
Acute Care - Occupational Therapy Discharge Summary  [facility]     Patient Name: Hilda Cantrell  : 1975  MRN: [id number]    Today's Date: 2020  Onset of Illness/Injury or Date of Surgery: 20    Date of Referral to OT: 20  Referring Physician: Dr. Lewis      Admit Date: 2020        OT Recommendation and Plan    Visit Dx:    ICD-10-CM ICD-9-CM   1. Closed bimalleolar fracture of right ankle, initial encounter S82.841A 824.4   2. Closed fracture of distal end of right tibia, unspecified fracture morphology, initial encounter S82.301A 824.8   3. Impaired mobility Z74.09 799.89   4. Decreased activities of daily living (ADL) Z78.9 V49.89   5. Closed intra-articular fracture of distal tibia, left, initial encounter S82.392A 824.8               Rehab Goal Summary     Row Name 20 0700             Transfer Goal 1 (OT)    Activity/Assistive Device (Transfer Goal 1, OT)  sit-to-stand/stand-to-sit;bed-to-chair/chair-to-bed;toilet;shower chair  -TS      [place] Level/Cues Needed (Transfer Goal 1, OT)  conditional independence  -TS      Time Frame (Transfer Goal 1, OT)  long term goal (LTG);by discharge  -TS      Progress/Outcome (Transfer Goal 1, OT)  goal not met  -TS         Dressing Goal 1 (OT)    Activity/Assistive Device (Dressing Goal 1, OT)  lower body dressing  -TS      [place]/Cues Needed (Dressing Goal 1, OT)  minimum assist (75% or more patient effort)  -TS      Time Frame (Dressing Goal 1, OT)  long term goal (LTG);by discharge  -TS      Progress/Outcome (Dressing Goal 1, OT)  goal not met  -TS         Toileting Goal 1 (OT)    Activity/Device (Toileting Goal 1, OT)  toileting skills, all;commode  -TS      [place] Level/Cues Needed (Toileting Goal 1, OT)  minimum assist (75% or more patient effort)  -TS      Time Frame (Toileting Goal 1, OT)  long term goal (LTG);by discharge  -TS      Progress/Outcome (Toileting Goal 1, OT)  goal not met  -TS        User Key  (r)  = Recorded By, (t) = Taken By, (c) = Cosigned By    Initials Name Provider Type Discipline     Bettina Browning COTA/L Occupational Therapy Assistant OT          Outcome Measures     Row Name 08/15/20 0858 08/14/20 1300          How much help from another is currently needed...    Putting on and taking off regular lower body clothing?  2  -MT  2  -MW     Bathing (including washing, rinsing, and drying)  2  -MT  2  -MW     Toileting (which includes using toilet bed pan or urinal)  2  -MT  2  -MW     Putting on and taking off regular upper body clothing  4  -MT  4  -MW     Taking care of personal grooming (such as brushing teeth)  4  -MT  4  -MW     Eating meals  4  -MT  4  -MW     AM-PAC 6 Clicks Score (OT)  18  -MT  18  -MW        Functional Assessment    Outcome Measure Options  --  AM-PAC 6 Clicks Daily Activity (OT)  -MW       User Key  (r) = Recorded By, (t) = Taken By, (c) = Cosigned By    Initials Name Provider Type    MW Neva Schneider OTR/L Occupational Therapist    Vidya Moreland COTA/L Occupational Therapy Assistant          Therapy Suggested Charges     Code   Minutes Charges    40423 (CPT®) Hc Ot Neuromusc Re Education Ea 15 Min      19319 (CPT®) Hc Ot Ther Proc Ea 15 Min      86505 (CPT®) Hc Ot Therapeutic Act Ea 15 Min      46765 (CPT®) Hc Ot Manual Therapy Ea 15 Min      04585 (CPT®) Hc Ot Iontophoresis Ea 15 Min      90370 (CPT®) Hc Ot Elec Stim Ea-Per 15 Min      15074 (CPT®) Hc Ot Ultrasound Ea 15 Min      29106 (CPT®) Hc Ot Self Care/Mgmt/Train Ea 15 Min 14 1    Total  14 1              OT Discharge Summary  Reason for Discharge: Discharge from facility  Outcomes Achieved: Refer to plan of care for updates on goals achieved  Discharge Destination: Home with assist      ALMA Hunt/PAIGE  8/17/2020

## 2020-10-02 ENCOUNTER — TRANSCRIBE ORDERS (OUTPATIENT)
Dept: ADMINISTRATIVE | Facility: HOSPITAL | Age: 45
End: 2020-10-02

## 2020-10-07 ENCOUNTER — TRANSCRIBE ORDERS (OUTPATIENT)
Dept: ADMINISTRATIVE | Facility: HOSPITAL | Age: 45
End: 2020-10-07

## 2020-10-07 ENCOUNTER — LAB (OUTPATIENT)
Dept: LAB | Facility: HOSPITAL | Age: 45
End: 2020-10-07

## 2020-10-07 DIAGNOSIS — Z00.00 ROUTINE GENERAL MEDICAL EXAMINATION AT A HEALTH CARE FACILITY: ICD-10-CM

## 2020-10-07 DIAGNOSIS — Z00.00 ROUTINE GENERAL MEDICAL EXAMINATION AT A HEALTH CARE FACILITY: Primary | ICD-10-CM

## 2020-10-07 LAB
ALBUMIN SERPL-MCNC: 4 G/DL (ref 3.5–5.2)
ALBUMIN/GLOB SERPL: 1 G/DL
ALP SERPL-CCNC: 140 U/L (ref 39–117)
ALT SERPL W P-5'-P-CCNC: 40 U/L (ref 1–33)
AMPHET+METHAMPHET UR QL: POSITIVE
AMPHETAMINES UR QL: NEGATIVE
ANION GAP SERPL CALCULATED.3IONS-SCNC: 8 MMOL/L (ref 5–15)
AST SERPL-CCNC: 35 U/L (ref 1–32)
BARBITURATES UR QL SCN: NEGATIVE
BASOPHILS # BLD AUTO: 0.07 10*3/MM3 (ref 0–0.2)
BASOPHILS NFR BLD AUTO: 0.8 % (ref 0–1.5)
BENZODIAZ UR QL SCN: POSITIVE
BILIRUB SERPL-MCNC: 0.3 MG/DL (ref 0–1.2)
BUN SERPL-MCNC: 12 MG/DL (ref 6–20)
BUN/CREAT SERPL: 21.1 (ref 7–25)
BUPRENORPHINE SERPL-MCNC: NEGATIVE NG/ML
CALCIUM SPEC-SCNC: 9.2 MG/DL (ref 8.6–10.5)
CANNABINOIDS SERPL QL: NEGATIVE
CHLORIDE SERPL-SCNC: 102 MMOL/L (ref 98–107)
CO2 SERPL-SCNC: 26 MMOL/L (ref 22–29)
COCAINE UR QL: NEGATIVE
CREAT SERPL-MCNC: 0.57 MG/DL (ref 0.57–1)
DEPRECATED RDW RBC AUTO: 46.7 FL (ref 37–54)
EOSINOPHIL # BLD AUTO: 0.3 10*3/MM3 (ref 0–0.4)
EOSINOPHIL NFR BLD AUTO: 3.2 % (ref 0.3–6.2)
ERYTHROCYTE [DISTWIDTH] IN BLOOD BY AUTOMATED COUNT: 14.4 % (ref 12.3–15.4)
GFR SERPL CREATININE-BSD FRML MDRD: 115 ML/MIN/1.73
GLOBULIN UR ELPH-MCNC: 3.9 GM/DL
GLUCOSE SERPL-MCNC: 109 MG/DL (ref 65–99)
HCT VFR BLD AUTO: 34.9 % (ref 34–46.6)
HGB BLD-MCNC: 11.6 G/DL (ref 12–15.9)
IMM GRANULOCYTES # BLD AUTO: 0.03 10*3/MM3 (ref 0–0.05)
IMM GRANULOCYTES NFR BLD AUTO: 0.3 % (ref 0–0.5)
LYMPHOCYTES # BLD AUTO: 2.65 10*3/MM3 (ref 0.7–3.1)
LYMPHOCYTES NFR BLD AUTO: 28.6 % (ref 19.6–45.3)
MCH RBC QN AUTO: 29.5 PG (ref 26.6–33)
MCHC RBC AUTO-ENTMCNC: 33.2 G/DL (ref 31.5–35.7)
MCV RBC AUTO: 88.8 FL (ref 79–97)
METHADONE UR QL SCN: NEGATIVE
MONOCYTES # BLD AUTO: 0.53 10*3/MM3 (ref 0.1–0.9)
MONOCYTES NFR BLD AUTO: 5.7 % (ref 5–12)
NEUTROPHILS NFR BLD AUTO: 5.7 10*3/MM3 (ref 1.7–7)
NEUTROPHILS NFR BLD AUTO: 61.4 % (ref 42.7–76)
NRBC BLD AUTO-RTO: 0 /100 WBC (ref 0–0.2)
OPIATES UR QL: NEGATIVE
OXYCODONE UR QL SCN: NEGATIVE
PCP UR QL SCN: NEGATIVE
PLATELET # BLD AUTO: 325 10*3/MM3 (ref 140–450)
PMV BLD AUTO: 10 FL (ref 6–12)
POTASSIUM SERPL-SCNC: 3.7 MMOL/L (ref 3.5–5.2)
PROPOXYPH UR QL: NEGATIVE
PROT SERPL-MCNC: 7.9 G/DL (ref 6–8.5)
RBC # BLD AUTO: 3.93 10*6/MM3 (ref 3.77–5.28)
SODIUM SERPL-SCNC: 136 MMOL/L (ref 136–145)
TRICYCLICS UR QL SCN: POSITIVE
WBC # BLD AUTO: 9.28 10*3/MM3 (ref 3.4–10.8)

## 2020-10-07 PROCEDURE — 83036 HEMOGLOBIN GLYCOSYLATED A1C: CPT | Performed by: EMERGENCY MEDICINE

## 2020-10-07 PROCEDURE — 83090 ASSAY OF HOMOCYSTEINE: CPT | Performed by: EMERGENCY MEDICINE

## 2020-10-07 PROCEDURE — 86140 C-REACTIVE PROTEIN: CPT | Performed by: EMERGENCY MEDICINE

## 2020-10-07 PROCEDURE — 36415 COLL VENOUS BLD VENIPUNCTURE: CPT

## 2020-10-07 PROCEDURE — 82306 VITAMIN D 25 HYDROXY: CPT | Performed by: EMERGENCY MEDICINE

## 2020-10-07 PROCEDURE — 85025 COMPLETE CBC W/AUTO DIFF WBC: CPT | Performed by: EMERGENCY MEDICINE

## 2020-10-07 PROCEDURE — 80306 DRUG TEST PRSMV INSTRMNT: CPT | Performed by: EMERGENCY MEDICINE

## 2020-10-07 PROCEDURE — 84443 ASSAY THYROID STIM HORMONE: CPT | Performed by: EMERGENCY MEDICINE

## 2020-10-07 PROCEDURE — 80053 COMPREHEN METABOLIC PANEL: CPT | Performed by: EMERGENCY MEDICINE

## 2020-10-07 PROCEDURE — 80061 LIPID PANEL: CPT | Performed by: EMERGENCY MEDICINE

## 2020-10-07 PROCEDURE — 84439 ASSAY OF FREE THYROXINE: CPT | Performed by: EMERGENCY MEDICINE

## 2020-10-08 LAB
25(OH)D3 SERPL-MCNC: 33.6 NG/ML (ref 30–100)
CHOLEST SERPL-MCNC: 199 MG/DL (ref 0–200)
CRP SERPL-MCNC: 1.4 MG/DL (ref 0–0.5)
HBA1C MFR BLD: 5.5 % (ref 4.8–5.6)
HCYS SERPL-MCNC: 9.7 UMOL/L (ref 0–15)
HDLC SERPL-MCNC: 74 MG/DL (ref 40–60)
LDLC SERPL CALC-MCNC: 96 MG/DL (ref 0–100)
LDLC/HDLC SERPL: 1.3 {RATIO}
T4 FREE SERPL-MCNC: 0.99 NG/DL (ref 0.93–1.7)
TRIGL SERPL-MCNC: 143 MG/DL (ref 0–150)
TSH SERPL DL<=0.05 MIU/L-ACNC: 3.33 UIU/ML (ref 0.27–4.2)
VLDLC SERPL-MCNC: 28.6 MG/DL

## 2020-10-13 ENCOUNTER — TRANSCRIBE ORDERS (OUTPATIENT)
Dept: ADMINISTRATIVE | Facility: HOSPITAL | Age: 45
End: 2020-10-13

## 2020-10-13 DIAGNOSIS — M25.531 PAIN IN RIGHT WRIST: Primary | ICD-10-CM

## 2020-10-20 ENCOUNTER — HOSPITAL ENCOUNTER (OUTPATIENT)
Dept: CT IMAGING | Facility: HOSPITAL | Age: 45
Discharge: HOME | End: 2020-10-20
Admitting: PHYSICIAN ASSISTANT

## 2020-10-20 DIAGNOSIS — M25.531 PAIN IN RIGHT WRIST: ICD-10-CM

## 2020-10-20 PROCEDURE — 73200 CT UPPER EXTREMITY W/O DYE: CPT

## 2021-03-25 ENCOUNTER — IMMUNIZATION (OUTPATIENT)
Dept: VACCINE CLINIC | Facility: HOSPITAL | Age: 46
End: 2021-03-25

## 2021-03-25 PROCEDURE — 91301 HC SARSCO02 VAC 100MCG/0.5ML IM: CPT | Performed by: OBSTETRICS & GYNECOLOGY

## 2021-03-25 PROCEDURE — 0011A: CPT | Performed by: OBSTETRICS & GYNECOLOGY

## 2021-04-22 ENCOUNTER — IMMUNIZATION (OUTPATIENT)
Dept: VACCINE CLINIC | Facility: HOSPITAL | Age: 46
End: 2021-04-22

## 2021-04-22 PROCEDURE — 91301 HC SARSCO02 VAC 100MCG/0.5ML IM: CPT | Performed by: OBSTETRICS & GYNECOLOGY

## 2021-04-22 PROCEDURE — 0012A: CPT | Performed by: OBSTETRICS & GYNECOLOGY

## 2024-01-12 ENCOUNTER — TRANSCRIBE ORDERS (OUTPATIENT)
Dept: ADMINISTRATIVE | Facility: HOSPITAL | Age: 49
End: 2024-01-12
Payer: MEDICAID

## 2024-01-12 DIAGNOSIS — M47.816 LUMBAR SPONDYLOSIS: ICD-10-CM

## 2024-01-12 DIAGNOSIS — M54.16 LUMBAR RADICULOPATHY: Primary | ICD-10-CM

## 2024-10-24 ENCOUNTER — HOSPITAL ENCOUNTER (OUTPATIENT)
Dept: MRI IMAGING | Facility: HOSPITAL | Age: 49
Discharge: HOME | End: 2024-10-24
Admitting: NURSE PRACTITIONER
Payer: MEDICAID

## 2024-10-24 DIAGNOSIS — M54.16 LUMBAR RADICULOPATHY: ICD-10-CM

## 2024-10-24 DIAGNOSIS — M47.816 LUMBAR SPONDYLOSIS: ICD-10-CM

## 2024-10-24 PROCEDURE — 72148 MRI LUMBAR SPINE W/O DYE: CPT

## 2025-03-12 ENCOUNTER — TRANSCRIBE ORDERS (OUTPATIENT)
Dept: ADMINISTRATIVE | Facility: HOSPITAL | Age: 50
End: 2025-03-12
Payer: MEDICAID

## 2025-03-12 DIAGNOSIS — Z12.31 ENCOUNTER FOR SCREENING MAMMOGRAM FOR MALIGNANT NEOPLASM OF BREAST: Primary | ICD-10-CM

## 2025-03-16 LAB
NCCN CRITERIA FLAG: NORMAL
TYRER CUZICK SCORE: 12

## 2025-03-21 ENCOUNTER — HOSPITAL ENCOUNTER (OUTPATIENT)
Dept: MAMMOGRAPHY | Facility: HOSPITAL | Age: 50
Discharge: HOME | End: 2025-03-21
Payer: MEDICAID

## 2025-03-21 DIAGNOSIS — Z12.31 ENCOUNTER FOR SCREENING MAMMOGRAM FOR MALIGNANT NEOPLASM OF BREAST: ICD-10-CM

## 2025-03-21 PROCEDURE — 77063 BREAST TOMOSYNTHESIS BI: CPT

## 2025-03-21 PROCEDURE — 77067 SCR MAMMO BI INCL CAD: CPT

## 2025-06-09 ENCOUNTER — OFFICE VISIT (OUTPATIENT)
Dept: GASTROENTEROLOGY | Facility: CLINIC | Age: 50
End: 2025-06-09
Payer: MEDICAID

## 2025-06-09 ENCOUNTER — LAB (OUTPATIENT)
Dept: LAB | Facility: HOSPITAL | Age: 50
End: 2025-06-09
Payer: MEDICAID

## 2025-06-09 VITALS
BODY MASS INDEX: 41.02 KG/M2 | HEIGHT: 71 IN | HEART RATE: 84 BPM | WEIGHT: 293 LBS | TEMPERATURE: 97.3 F | SYSTOLIC BLOOD PRESSURE: 130 MMHG | OXYGEN SATURATION: 98 % | DIASTOLIC BLOOD PRESSURE: 80 MMHG

## 2025-06-09 DIAGNOSIS — R79.89 ELEVATED LIVER FUNCTION TESTS: Primary | ICD-10-CM

## 2025-06-09 DIAGNOSIS — R79.89 ELEVATED LIVER FUNCTION TESTS: ICD-10-CM

## 2025-06-09 LAB
ALBUMIN SERPL-MCNC: 3.9 G/DL (ref 3.5–5.2)
ALBUMIN/GLOB SERPL: 1.2 G/DL
ALP SERPL-CCNC: 153 U/L (ref 39–117)
ALT SERPL W P-5'-P-CCNC: 48 U/L (ref 1–33)
ANION GAP SERPL CALCULATED.3IONS-SCNC: 11 MMOL/L (ref 5–15)
AST SERPL-CCNC: 37 U/L (ref 1–32)
BASOPHILS # BLD AUTO: 0.05 10*3/MM3 (ref 0–0.2)
BASOPHILS NFR BLD AUTO: 0.6 % (ref 0–1.5)
BILIRUB SERPL-MCNC: 0.4 MG/DL (ref 0–1.2)
BUN SERPL-MCNC: 12.7 MG/DL (ref 6–20)
BUN/CREAT SERPL: 20.8 (ref 7–25)
CALCIUM SPEC-SCNC: 9.3 MG/DL (ref 8.6–10.5)
CHLORIDE SERPL-SCNC: 100 MMOL/L (ref 98–107)
CO2 SERPL-SCNC: 28 MMOL/L (ref 22–29)
CREAT SERPL-MCNC: 0.61 MG/DL (ref 0.57–1)
DEPRECATED RDW RBC AUTO: 49.1 FL (ref 37–54)
EGFRCR SERPLBLD CKD-EPI 2021: 109.8 ML/MIN/1.73
EOSINOPHIL # BLD AUTO: 0.15 10*3/MM3 (ref 0–0.4)
EOSINOPHIL NFR BLD AUTO: 1.9 % (ref 0.3–6.2)
ERYTHROCYTE [DISTWIDTH] IN BLOOD BY AUTOMATED COUNT: 14.5 % (ref 12.3–15.4)
FERRITIN SERPL-MCNC: 83.59 NG/ML (ref 13–150)
GLOBULIN UR ELPH-MCNC: 3.3 GM/DL
GLUCOSE SERPL-MCNC: 99 MG/DL (ref 65–99)
HAV IGM SERPL QL IA: NORMAL
HBV SURFACE AG SERPL QL IA: NORMAL
HCT VFR BLD AUTO: 37.8 % (ref 34–46.6)
HCV AB SER QL: NORMAL
HGB BLD-MCNC: 12.3 G/DL (ref 12–15.9)
IMM GRANULOCYTES # BLD AUTO: 0.02 10*3/MM3 (ref 0–0.05)
IMM GRANULOCYTES NFR BLD AUTO: 0.2 % (ref 0–0.5)
INR PPP: 0.94 (ref 0.91–1.09)
LYMPHOCYTES # BLD AUTO: 1.93 10*3/MM3 (ref 0.7–3.1)
LYMPHOCYTES NFR BLD AUTO: 23.9 % (ref 19.6–45.3)
MCH RBC QN AUTO: 29.9 PG (ref 26.6–33)
MCHC RBC AUTO-ENTMCNC: 32.5 G/DL (ref 31.5–35.7)
MCV RBC AUTO: 91.7 FL (ref 79–97)
MONOCYTES # BLD AUTO: 0.56 10*3/MM3 (ref 0.1–0.9)
MONOCYTES NFR BLD AUTO: 6.9 % (ref 5–12)
NEUTROPHILS NFR BLD AUTO: 5.35 10*3/MM3 (ref 1.7–7)
NEUTROPHILS NFR BLD AUTO: 66.5 % (ref 42.7–76)
NRBC BLD AUTO-RTO: 0 /100 WBC (ref 0–0.2)
PLATELET # BLD AUTO: 208 10*3/MM3 (ref 140–450)
PMV BLD AUTO: 11.3 FL (ref 6–12)
POTASSIUM SERPL-SCNC: 3.8 MMOL/L (ref 3.5–5.2)
PROT SERPL-MCNC: 7.2 G/DL (ref 6–8.5)
PROTHROMBIN TIME: 13 SECONDS (ref 11.8–14.8)
RBC # BLD AUTO: 4.12 10*6/MM3 (ref 3.77–5.28)
SODIUM SERPL-SCNC: 139 MMOL/L (ref 136–145)
WBC NRBC COR # BLD AUTO: 8.06 10*3/MM3 (ref 3.4–10.8)

## 2025-06-09 PROCEDURE — 82103 ALPHA-1-ANTITRYPSIN TOTAL: CPT

## 2025-06-09 PROCEDURE — 82728 ASSAY OF FERRITIN: CPT

## 2025-06-09 PROCEDURE — 82104 ALPHA-1-ANTITRYPSIN PHENO: CPT

## 2025-06-09 PROCEDURE — 80053 COMPREHEN METABOLIC PANEL: CPT

## 2025-06-09 PROCEDURE — 87340 HEPATITIS B SURFACE AG IA: CPT

## 2025-06-09 PROCEDURE — 36415 COLL VENOUS BLD VENIPUNCTURE: CPT | Performed by: CLINICAL NURSE SPECIALIST

## 2025-06-09 PROCEDURE — 85610 PROTHROMBIN TIME: CPT | Performed by: CLINICAL NURSE SPECIALIST

## 2025-06-09 PROCEDURE — 86376 MICROSOMAL ANTIBODY EACH: CPT

## 2025-06-09 PROCEDURE — 86015 ACTIN ANTIBODY EACH: CPT

## 2025-06-09 PROCEDURE — 85025 COMPLETE CBC W/AUTO DIFF WBC: CPT

## 2025-06-09 PROCEDURE — 86038 ANTINUCLEAR ANTIBODIES: CPT

## 2025-06-09 PROCEDURE — 1160F RVW MEDS BY RX/DR IN RCRD: CPT | Performed by: CLINICAL NURSE SPECIALIST

## 2025-06-09 PROCEDURE — 86709 HEPATITIS A IGM ANTIBODY: CPT

## 2025-06-09 PROCEDURE — 99203 OFFICE O/P NEW LOW 30 MIN: CPT | Performed by: CLINICAL NURSE SPECIALIST

## 2025-06-09 PROCEDURE — 1159F MED LIST DOCD IN RCRD: CPT | Performed by: CLINICAL NURSE SPECIALIST

## 2025-06-09 PROCEDURE — 86706 HEP B SURFACE ANTIBODY: CPT

## 2025-06-09 PROCEDURE — 82390 ASSAY OF CERULOPLASMIN: CPT

## 2025-06-09 PROCEDURE — 86803 HEPATITIS C AB TEST: CPT

## 2025-06-09 PROCEDURE — 86381 MITOCHONDRIAL ANTIBODY EACH: CPT

## 2025-06-09 RX ORDER — ASPIRIN 81 MG/1
81 TABLET ORAL DAILY
COMMUNITY

## 2025-06-09 NOTE — PROGRESS NOTES
Hilda Cantrell  1975    6/9/2025  Chief Complaint   Patient presents with    GI Problem     Elevated lfts     Subjective   HPI  Hilda Cantrell is a 49 y.o. female who presents with a complaint of elevated liver function tests AST 80, ALT 81, ALKP 345, T abili 1.3. 4/8/25 labs. She has no associated symptoms. No abdominal, overall feels well. She has had her cologuard last year and this was negative. No family hx for colon cancer.   BMI is her risk factor for fatty liver.      Past Medical History:   Diagnosis Date    Anxiety     Breast injury 2019    left breast, [place]    Hyperlipidemia     Hypertension     Insomnia      Past Surgical History:   Procedure Laterality Date    ENDOMETRIAL ABLATION      GASTRIC SLEEVE LAPAROSCOPIC      TUBAL ABDOMINAL LIGATION         Outpatient Medications Marked as Taking for the 6/9/25 encounter (Office Visit) with Yandy Shaikh APRN   Medication Sig Dispense Refill    aspirin 81 MG EC tablet Take 1 tablet by mouth Daily.      atorvastatin (LIPITOR) 20 MG tablet Take 1 tablet by mouth Daily.      clonazePAM (KlonoPIN) 2 MG disintegrating tablet Place 1 tablet on the tongue 2 (Two) Times a Day As Needed.      Fluticasone Propionate, Inhal, (Fluticasone Propionate Diskus) 50 MCG/ACT aerosol powder  Inhale 50 mcg Daily.      HYDROcodone-acetaminophen (NORCO) 7.5-325 MG per tablet Take 1 tablet by mouth Every 6 (Six) Hours As Needed for Moderate Pain.      metoprolol tartrate (LOPRESSOR) 50 MG tablet Take 1 tablet by mouth Every Night.      OLANZapine (zyPREXA) 20 MG tablet Take 1 tablet by mouth Every Night.      pregabalin (LYRICA) 75 MG capsule Take 1 capsule by mouth 2 (Two) Times a Day.      venlafaxine XR (EFFEXOR-XR) 150 MG 24 hr capsule Take 1 capsule by mouth Daily.       Allergies   Allergen Reactions    Codeine Itching     Social History     Socioeconomic History    Marital status: Single   Tobacco Use    Smoking status: Never   Vaping Use    Vaping  status: Never Used   Substance and Sexual Activity    Alcohol use: Yes     Comment: occ    Drug use: Not Currently    Sexual activity: Defer     Family History   Problem Relation Age of Onset    Breast cancer Neg Hx     Colon cancer Neg Hx     Colon polyps Neg Hx      Health Maintenance   Topic Date Due    PAP SMEAR  Never done    COLORECTAL CANCER SCREENING  Never done    COVID-19 Vaccine (4 - 2024-25 season) 09/01/2024    HEPATITIS C SCREENING  Never done    ANNUAL PHYSICAL  Never done    INFLUENZA VACCINE  07/01/2025    MAMMOGRAM  03/21/2027    TDAP/TD VACCINES (2 - Td or Tdap) 03/06/2035    Pneumococcal Vaccine 0-49  Aged Out     Review of Systems   Constitutional:  Negative for activity change, appetite change, chills, diaphoresis, fatigue, fever and unexpected weight change.   HENT:  Negative for ear pain, hearing loss, mouth sores, sore throat, trouble swallowing and voice change.    Eyes: Negative.    Respiratory:  Negative for cough, choking, shortness of breath and wheezing.    Cardiovascular:  Negative for chest pain and palpitations.   Gastrointestinal:  Negative for abdominal pain, blood in stool, constipation, diarrhea, nausea and vomiting.   Endocrine: Negative for cold intolerance and heat intolerance.   Genitourinary:  Negative for decreased urine volume, dysuria, frequency, hematuria and urgency.   Musculoskeletal:  Negative for back pain, gait problem and myalgias.   Skin:  Negative for color change, pallor and rash.   Allergic/Immunologic: Negative for food allergies and immunocompromised state.   Neurological:  Negative for dizziness, tremors, seizures, syncope, weakness, light-headedness, numbness and headaches.   Hematological:  Negative for adenopathy. Does not bruise/bleed easily.   Psychiatric/Behavioral:  Negative for agitation and confusion. The patient is not nervous/anxious.    All other systems reviewed and are negative.    Objective   Vitals:    06/09/25 1316   BP: 130/80   BP  "Location: Left arm   Pulse: 84   Temp: 97.3 °F (36.3 °C)   TempSrc: Temporal   SpO2: 98%   Weight: (!) 157 kg (346 lb 3.2 oz)   Height: 180.3 cm (70.98\")     Body mass index is 48.31 kg/m².  Physical Exam  Constitutional:       Appearance: She is well-developed.   HENT:      Head: Normocephalic and atraumatic.   Eyes:      Pupils: Pupils are equal, round, and reactive to light.   Neck:      Trachea: No tracheal deviation.   Cardiovascular:      Rate and Rhythm: Normal rate and regular rhythm.      Heart sounds: Normal heart sounds. No murmur heard.     No friction rub. No gallop.   Pulmonary:      Effort: Pulmonary effort is normal. No respiratory distress.      Breath sounds: Normal breath sounds. No wheezing or rales.   Chest:      Chest wall: No tenderness.   Abdominal:      General: Bowel sounds are normal. There is no distension.      Palpations: Abdomen is soft. Abdomen is not rigid.      Tenderness: There is no abdominal tenderness. There is no guarding or rebound.   Musculoskeletal:         General: No tenderness or deformity. Normal range of motion.      Cervical back: Normal range of motion and neck supple.   Skin:     General: Skin is warm and dry.      Coloration: Skin is not pale.      Findings: No rash.   Neurological:      Mental Status: She is alert and oriented to person, place, and time.      Deep Tendon Reflexes: Reflexes are normal and symmetric.   Psychiatric:         Behavior: Behavior normal.         Thought Content: Thought content normal.         Judgment: Judgment normal.       Assessment & Plan   Diagnoses and all orders for this visit:    1. Elevated liver function tests (Primary)  -     US Liver; Future  -     US Elastography Paranchyma; Future  -     Comprehensive Metabolic Panel; Future  -     Alpha - 1 - Antitrypsin Phenotype; Future  -     DEV; Future  -     Anti-Smooth Muscle Antibody Titer; Future  -     Ceruloplasmin; Future  -     Ferritin; Future  -     Hepatitis A Antibody, " IgM; Future  -     Hepatitis B Surface Antibody; Future  -     Hepatitis B Surface Antigen; Future  -     Hepatitis C Antibody; Future  -     Mitochondrial Antibodies, M2; Future  -     Anti-microsomal Antibody; Future  -     CBC & Differential; Future  -     Protime-INR        Part of this note may be an electronic transcription/translation of spoken language to printed text using the Dragon Dictation System.  Body mass index is 48.31 kg/m².  Return in about 6 weeks (around 7/21/2025).    Class 3 Severe Obesity (BMI >=40). Obesity-related health conditions include the following: hypertension. Obesity is unchanged. BMI is is above average; BMI management plan is completed. We discussed portion control and increasing exercise.      All risks, benefits, alternatives, and indications of colonoscopy and/or Endoscopy procedure have been discussed with the patient. Risks to include perforation of the colon requiring possible surgery or colostomy, risk of bleeding from biopsies or removal of colon tissue, possibility of missing a colon polyp or cancer, or adverse drug reaction.  Benefits to include the diagnosis and management of disease of the colon and rectum. Alternatives to include barium enema, radiographic evaluation, lab testing or no intervention. Pt verbalizes understanding and agrees.     Yandy Shaikh, APRN  6/9/2025  13:39 CDT          If you smoke or use tobacco, 4 minutes reading provided  Steps to Quit Smoking  Smoking tobacco can be harmful to your health and can affect almost every organ in your body. Smoking puts you, and those around you, at risk for developing many serious chronic diseases. Quitting smoking is difficult, but it is one of the best things that you can do for your health. It is never too late to quit.  What are the benefits of quitting smoking?  When you quit smoking, you lower your risk of developing serious diseases and conditions, such as:  Lung cancer or lung disease, such as  COPD.  Heart disease.  Stroke.  Heart attack.  Infertility.  Osteoporosis and bone fractures.  Additionally, symptoms such as coughing, wheezing, and shortness of breath may get better when you quit. You may also find that you get sick less often because your body is stronger at fighting off colds and infections. If you are pregnant, quitting smoking can help to reduce your chances of having a baby of low birth weight.  How do I get ready to quit?  When you decide to quit smoking, create a plan to make sure that you are successful. Before you quit:  Pick a date to quit. Set a date within the next two weeks to give you time to prepare.  Write down the reasons why you are quitting. Keep this list in places where you will see it often, such as on your bathroom mirror or in your car or wallet.  Identify the people, places, things, and activities that make you want to smoke (triggers) and avoid them. Make sure to take these actions:  Throw away all cigarettes at home, at work, and in your car.  Throw away smoking accessories, such as ashtrays and lighters.  Clean your car and make sure to empty the ashtray.  Clean your home, including curtains and carpets.  Tell your family, friends, and coworkers that you are quitting. Support from your loved ones can make quitting easier.  Talk with your health care provider about your options for quitting smoking.  Find out what treatment options are covered by your health insurance.  What strategies can I use to quit smoking?  Talk with your healthcare provider about different strategies to quit smoking. Some strategies include:  Quitting smoking altogether instead of gradually lessening how much you smoke over a period of time. Research shows that quitting “cold turkey” is more successful than gradually quitting.  Attending in-person counseling to help you build problem-solving skills. You are more likely to have success in quitting if you attend several counseling sessions. Even  short sessions of 10 minutes can be effective.  Finding resources and support systems that can help you to quit smoking and remain smoke-free after you quit. These resources are most helpful when you use them often. They can include:  Online chats with a counselor.  Telephone quitlines.  Printed self-help materials.  Support groups or group counseling.  Text messaging programs.  Mobile phone applications.  Taking medicines to help you quit smoking. (If you are pregnant or breastfeeding, talk with your health care provider first.) Some medicines contain nicotine and some do not. Both types of medicines help with cravings, but the medicines that include nicotine help to relieve withdrawal symptoms. Your health care provider may recommend:  Nicotine patches, gum, or lozenges.  Nicotine inhalers or sprays.  Non-nicotine medicine that is taken by mouth.  Talk with your health care provider about combining strategies, such as taking medicines while you are also receiving in-person counseling. Using these two strategies together makes you more likely to succeed in quitting than if you used either strategy on its own.  If you are pregnant or breastfeeding, talk with your health care provider about finding counseling or other support strategies to quit smoking. Do not take medicine to help you quit smoking unless told to do so by your health care provider.  What things can I do to make it easier to quit?  Quitting smoking might feel overwhelming at first, but there is a lot that you can do to make it easier. Take these important actions:  Reach out to your family and friends and ask that they support and encourage you during this time. Call telephone quitlines, reach out to support groups, or work with a counselor for support.  Ask people who smoke to avoid smoking around you.  Avoid places that trigger you to smoke, such as bars, parties, or smoke-break areas at work.  Spend time around people who do not smoke.  Lessen  stress in your life, because stress can be a smoking trigger for some people. To lessen stress, try:  Exercising regularly.  Deep-breathing exercises.  Yoga.  Meditating.  Performing a body scan. This involves closing your eyes, scanning your body from head to toe, and noticing which parts of your body are particularly tense. Purposefully relax the muscles in those areas.  Download or purchase mobile phone or tablet apps (applications) that can help you stick to your quit plan by providing reminders, tips, and encouragement. There are many free apps, such as QuitGuide from the CDC (Centers for Disease Control and Prevention). You can find other support for quitting smoking (smoking cessation) through smokefree.gov and other websites.  How will I feel when I quit smoking?  Within the first 24 hours of quitting smoking, you may start to feel some withdrawal symptoms. These symptoms are usually most noticeable 2-3 days after quitting, but they usually do not last beyond 2-3 weeks. Changes or symptoms that you might experience include:  Mood swings.  Restlessness, anxiety, or irritation.  Difficulty concentrating.  Dizziness.  Strong cravings for sugary foods in addition to nicotine.  Mild weight gain.  Constipation.  Nausea.  Coughing or a sore throat.  Changes in how your medicines work in your body.  A depressed mood.  Difficulty sleeping (insomnia).  After the first 2-3 weeks of quitting, you may start to notice more positive results, such as:  Improved sense of smell and taste.  Decreased coughing and sore throat.  Slower heart rate.  Lower blood pressure.  Clearer skin.  The ability to breathe more easily.  Fewer sick days.  Quitting smoking is very challenging for most people. Do not get discouraged if you are not successful the first time. Some people need to make many attempts to quit before they achieve long-term success. Do your best to stick to your quit plan, and talk with your health care provider if you  have any questions or concerns.  This information is not intended to replace advice given to you by your health care provider. Make sure you discuss any questions you have with your health care provider.  Document Released: 12/12/2002 Document Revised: 08/15/2017 Document Reviewed: 05/03/2016  [employer] Interactive Patient Education © 2017 [employer]

## 2025-06-10 LAB
ANA SER QL: NEGATIVE
CERULOPLASMIN SERPL-MCNC: 35 MG/DL (ref 19–39)
HBV SURFACE AB SER RIA-ACNC: NORMAL
LKM-1 AB SER-ACNC: 1.2 UNITS (ref 0–20)
MITOCHONDRIA M2 IGG SER-ACNC: <20 UNITS (ref 0–20)
SMA IGG SER-ACNC: 35 UNITS (ref 0–19)

## 2025-06-12 LAB
A1AT PHENOTYP SERPL IFE: NORMAL
A1AT SERPL-MCNC: 167 MG/DL (ref 101–187)

## 2025-06-23 ENCOUNTER — HOSPITAL ENCOUNTER (OUTPATIENT)
Dept: ULTRASOUND IMAGING | Facility: HOSPITAL | Age: 50
Discharge: HOME | End: 2025-06-23
Payer: MEDICAID

## 2025-06-23 DIAGNOSIS — R79.89 ELEVATED LIVER FUNCTION TESTS: ICD-10-CM

## 2025-06-23 PROCEDURE — 76981 USE PARENCHYMA: CPT

## 2025-06-23 PROCEDURE — 76705 ECHO EXAM OF ABDOMEN: CPT

## 2025-07-31 ENCOUNTER — OFFICE VISIT (OUTPATIENT)
Dept: GASTROENTEROLOGY | Facility: CLINIC | Age: 50
End: 2025-07-31
Payer: MEDICAID

## 2025-07-31 VITALS
OXYGEN SATURATION: 99 % | TEMPERATURE: 97.7 F | SYSTOLIC BLOOD PRESSURE: 108 MMHG | HEART RATE: 67 BPM | BODY MASS INDEX: 41.02 KG/M2 | HEIGHT: 71 IN | DIASTOLIC BLOOD PRESSURE: 70 MMHG | WEIGHT: 293 LBS

## 2025-07-31 DIAGNOSIS — Z78.9 NONSMOKER: ICD-10-CM

## 2025-07-31 DIAGNOSIS — R79.89 ELEVATED LIVER FUNCTION TESTS: Primary | ICD-10-CM

## 2025-07-31 PROCEDURE — 1159F MED LIST DOCD IN RCRD: CPT | Performed by: CLINICAL NURSE SPECIALIST

## 2025-07-31 PROCEDURE — 1160F RVW MEDS BY RX/DR IN RCRD: CPT | Performed by: CLINICAL NURSE SPECIALIST

## 2025-07-31 PROCEDURE — 99213 OFFICE O/P EST LOW 20 MIN: CPT | Performed by: CLINICAL NURSE SPECIALIST

## 2025-07-31 NOTE — PROGRESS NOTES
Hilda Cantrell  1975    7/31/2025  Chief Complaint   Patient presents with    GI Problem     6 wk follow up visit      Subjective   HPI     The patient is a 49-year-old female who is following up today in the office for elevated liver function tests.  Repeat labs show her numbers trending down. Incidental SMA positive, chasidy was negative. ALKP improved on repeat.   She reports no discomfort or symptoms such as pain, nausea, or vomiting that could be attributed to gallstones. Her alcohol consumption is minimal, with the last instance being a few weeks ago during a family gathering.     She takes hydrocodone 7.5 mg every 8 hours and ibuprofen for her back pain.    SOCIAL HISTORY  Alcohol: Consumes alcohol very infrequently, less than once a month.    FAMILY HISTORY  - Negative for colon cancer     Results  Labs   - Liver Function Test: 04/08/2025, AST: 80 U/L, ALT: 81 U/L, Alkaline Phosphatase: 345 U/L, Bilirubin: 1.3 mg/dL   - Liver Function Test: 06/09/2025, AST: 48 U/L, ALT: 37 U/L, Alkaline Phosphatase: 153 U/L   - CHASIDY: Negative   - Smooth Muscle Antibody: Positive at 35    Imaging   - Ultrasound with Elastography: 06/23/2025, Liver was coarse, echogenicity within normal limits, Metavir score F0. Gallstones noted.     Past Medical History:   Diagnosis Date    Anxiety     Breast injury 2019    left breast, [place]    Hyperlipidemia     Hypertension     Insomnia      Past Surgical History:   Procedure Laterality Date    ENDOMETRIAL ABLATION      GASTRIC SLEEVE LAPAROSCOPIC      TUBAL ABDOMINAL LIGATION         Outpatient Medications Marked as Taking for the 7/31/25 encounter (Office Visit) with Yandy Shaikh APRN   Medication Sig Dispense Refill    amphetamine-dextroamphetamine (ADDERALL) 30 MG tablet Take 1 tablet by mouth Daily.      aspirin 81 MG EC tablet Take 1 tablet by mouth Daily.      atorvastatin (LIPITOR) 20 MG tablet Take 1 tablet by mouth Daily.      clonazePAM (KlonoPIN) 2 MG  disintegrating tablet Place 1 tablet on the tongue 2 (Two) Times a Day As Needed.      Fluticasone Propionate, Inhal, (Fluticasone Propionate Diskus) 50 MCG/ACT aerosol powder  Inhale 50 mcg Daily.      HYDROcodone-acetaminophen (NORCO) 7.5-325 MG per tablet Take 1 tablet by mouth Every 6 (Six) Hours As Needed for Moderate Pain.      metoprolol tartrate (LOPRESSOR) 50 MG tablet Take 1 tablet by mouth Every Night.      OLANZapine (zyPREXA) 20 MG tablet Take 1 tablet by mouth Every Night.      pregabalin (LYRICA) 75 MG capsule Take 1 capsule by mouth 2 (Two) Times a Day.      venlafaxine XR (EFFEXOR-XR) 150 MG 24 hr capsule Take 1 capsule by mouth Daily.       Allergies   Allergen Reactions    Codeine Itching     Social History     Socioeconomic History    Marital status: Single   Tobacco Use    Smoking status: Never   Vaping Use    Vaping status: Never Used   Substance and Sexual Activity    Alcohol use: Yes     Comment: occ    Drug use: Not Currently    Sexual activity: Defer     Family History   Problem Relation Age of Onset    Breast cancer Neg Hx     Colon cancer Neg Hx     Colon polyps Neg Hx      Health Maintenance   Topic Date Due    PAP SMEAR  Never done    COLORECTAL CANCER SCREENING  Never done    COVID-19 Vaccine (4 - 2024-25 season) 09/01/2024    ANNUAL PHYSICAL  Never done    INFLUENZA VACCINE  10/01/2025    MAMMOGRAM  03/21/2027    TDAP/TD VACCINES (2 - Td or Tdap) 03/06/2035    HEPATITIS C SCREENING  Completed    Pneumococcal Vaccine 0-49  Aged Out     Review of Systems   Constitutional:  Negative for activity change, appetite change, chills, diaphoresis, fatigue, fever and unexpected weight change.   HENT:  Negative for ear pain, hearing loss, mouth sores, sore throat, trouble swallowing and voice change.    Eyes: Negative.    Respiratory:  Negative for cough, choking, shortness of breath and wheezing.    Cardiovascular:  Negative for chest pain and palpitations.   Gastrointestinal:  Negative for  "abdominal pain, blood in stool, constipation, diarrhea, nausea and vomiting.   Endocrine: Negative for cold intolerance and heat intolerance.   Genitourinary:  Negative for decreased urine volume, dysuria, frequency, hematuria and urgency.   Musculoskeletal:  Negative for back pain, gait problem and myalgias.   Skin:  Negative for color change, pallor and rash.   Allergic/Immunologic: Negative for food allergies and immunocompromised state.   Neurological:  Negative for dizziness, tremors, seizures, syncope, weakness, light-headedness, numbness and headaches.   Hematological:  Negative for adenopathy. Does not bruise/bleed easily.   Psychiatric/Behavioral:  Negative for agitation and confusion. The patient is not nervous/anxious.    All other systems reviewed and are negative.    Objective   Vitals:    07/31/25 1300   BP: 108/70   Pulse: 67   Temp: 97.7 °F (36.5 °C)   SpO2: 99%   Weight: (!) 159 kg (351 lb)   Height: 180.3 cm (70.98\")     Body mass index is 48.98 kg/m².  Physical Exam  Constitutional:       Appearance: She is well-developed.   HENT:      Head: Normocephalic and atraumatic.   Eyes:      Pupils: Pupils are equal, round, and reactive to light.   Neck:      Trachea: No tracheal deviation.   Cardiovascular:      Rate and Rhythm: Normal rate and regular rhythm.      Heart sounds: Normal heart sounds. No murmur heard.     No friction rub. No gallop.   Pulmonary:      Effort: Pulmonary effort is normal. No respiratory distress.      Breath sounds: Normal breath sounds. No wheezing or rales.   Chest:      Chest wall: No tenderness.   Abdominal:      General: Bowel sounds are normal. There is no distension.      Palpations: Abdomen is soft. Abdomen is not rigid.      Tenderness: There is no abdominal tenderness. There is no guarding or rebound.   Musculoskeletal:         General: No tenderness or deformity. Normal range of motion.      Cervical back: Normal range of motion and neck supple.   Skin:     " General: Skin is warm and dry.      Coloration: Skin is not pale.      Findings: No rash.   Neurological:      Mental Status: She is alert and oriented to person, place, and time.      Deep Tendon Reflexes: Reflexes are normal and symmetric.   Psychiatric:         Behavior: Behavior normal.         Thought Content: Thought content normal.         Judgment: Judgment normal.       Assessment & Plan   Diagnoses and all orders for this visit:    1. Elevated liver function tests (Primary)  -     Comprehensive Metabolic Panel; Future  -     Anti-Smooth Muscle Antibody Titer; Future    2. Nonsmoker           1. Elevated liver function tests:    - Liver function tests from 06/09/2025 show a decrease in ALT to 37 and alkaline phosphatase to 153 compared to previous levels.    - Ultrasound with elastography performed on 06/23/2025 revealed a coarse liver but no scarring or early signs of cirrhosis.    - Gallstones detected on ultrasound. No symptoms    - Smooth muscle antibody positive at 35, which could indicate autoimmune hepatitis or primary biliary cirrhosis, but not definitive. Will repeat labs wants to follow before considering referral to hepatology.    - Presence of fatty liver could contribute to elevated smooth muscle antibody levels.    - Unlikely that ankle swelling is related to liver condition.    - Maintain a low-fat diet, engage in regular exercise, and aim for weight loss over the next 3 months.    - Hydration is important; avoid fructose to reduce the risk of fatty liver.    - Repeat liver function tests and smooth muscle antibody in 3 months to monitor changes.  I discussed how fatty liver can lead to cirrhosis, disability and pre-mature death.  How it also can be a sign of increased risk for cardiovascular disease.  I discussed the importance of getting rid of fat in the liver by controlling lipids and glucose, avoiding etoh helps, and gradual weight loss to ideal body weight is very important.     2.  Health maintenance:    - Cologuard test from last year was negative.    - Repeat Cologuard test every 3 years.    - Inform provider if preferring a colonoscopy instead of Cologuard.    Follow-up: 09/2025    * Surgery not found *  Part of this note may be an electronic transcription/translation of spoken language to printed text using the Dragon Dictation System.  Body mass index is 48.98 kg/m².  Return in about 3 months (around 10/20/2025).  There are no Patient Instructions on file for this visit.         All risks, benefits, alternatives, and indications of colonoscopy and/or Endoscopy procedure have been discussed with the patient. Risks to include perforation of the colon requiring possible surgery or colostomy, risk of bleeding from biopsies or removal of colon tissue, possibility of missing a colon polyp or cancer, or adverse drug reaction.  Benefits to include the diagnosis and management of disease of the colon and rectum. Alternatives to include barium enema, radiographic evaluation, lab testing or no intervention. Pt verbalizes understanding and agrees.     Patient or patient representative verbalized consent for the use of Ambient Listening during the visit with  REYNA Meng for chart documentation. 7/31/2025  13:39 CDT    REYNA Meng  7/31/2025  13:39 CDT          If you smoke or use tobacco, 4 minutes reading provided  Steps to Quit Smoking  Smoking tobacco can be harmful to your health and can affect almost every organ in your body. Smoking puts you, and those around you, at risk for developing many serious chronic diseases. Quitting smoking is difficult, but it is one of the best things that you can do for your health. It is never too late to quit.  What are the benefits of quitting smoking?  When you quit smoking, you lower your risk of developing serious diseases and conditions, such as:  Lung cancer or lung disease, such as COPD.  Heart disease.  Stroke.  Heart  attack.  Infertility.  Osteoporosis and bone fractures.  Additionally, symptoms such as coughing, wheezing, and shortness of breath may get better when you quit. You may also find that you get sick less often because your body is stronger at fighting off colds and infections. If you are pregnant, quitting smoking can help to reduce your chances of having a baby of low birth weight.  How do I get ready to quit?  When you decide to quit smoking, create a plan to make sure that you are successful. Before you quit:  Pick a date to quit. Set a date within the next two weeks to give you time to prepare.  Write down the reasons why you are quitting. Keep this list in places where you will see it often, such as on your bathroom mirror or in your car or wallet.  Identify the people, places, things, and activities that make you want to smoke (triggers) and avoid them. Make sure to take these actions:  Throw away all cigarettes at home, at work, and in your car.  Throw away smoking accessories, such as ashtrays and lighters.  Clean your car and make sure to empty the ashtray.  Clean your home, including curtains and carpets.  Tell your family, friends, and coworkers that you are quitting. Support from your loved ones can make quitting easier.  Talk with your health care provider about your options for quitting smoking.  Find out what treatment options are covered by your health insurance.  What strategies can I use to quit smoking?  Talk with your healthcare provider about different strategies to quit smoking. Some strategies include:  Quitting smoking altogether instead of gradually lessening how much you smoke over a period of time. Research shows that quitting “cold turkey” is more successful than gradually quitting.  Attending in-person counseling to help you build problem-solving skills. You are more likely to have success in quitting if you attend several counseling sessions. Even short sessions of 10 minutes can be  effective.  Finding resources and support systems that can help you to quit smoking and remain smoke-free after you quit. These resources are most helpful when you use them often. They can include:  Online chats with a counselor.  Telephone quitlines.  Printed self-help materials.  Support groups or group counseling.  Text messaging programs.  Mobile phone applications.  Taking medicines to help you quit smoking. (If you are pregnant or breastfeeding, talk with your health care provider first.) Some medicines contain nicotine and some do not. Both types of medicines help with cravings, but the medicines that include nicotine help to relieve withdrawal symptoms. Your health care provider may recommend:  Nicotine patches, gum, or lozenges.  Nicotine inhalers or sprays.  Non-nicotine medicine that is taken by mouth.  Talk with your health care provider about combining strategies, such as taking medicines while you are also receiving in-person counseling. Using these two strategies together makes you more likely to succeed in quitting than if you used either strategy on its own.  If you are pregnant or breastfeeding, talk with your health care provider about finding counseling or other support strategies to quit smoking. Do not take medicine to help you quit smoking unless told to do so by your health care provider.  What things can I do to make it easier to quit?  Quitting smoking might feel overwhelming at first, but there is a lot that you can do to make it easier. Take these important actions:  Reach out to your family and friends and ask that they support and encourage you during this time. Call telephone quitlines, reach out to support groups, or work with a counselor for support.  Ask people who smoke to avoid smoking around you.  Avoid places that trigger you to smoke, such as bars, parties, or smoke-break areas at work.  Spend time around people who do not smoke.  Lessen stress in your life, because stress can  be a smoking trigger for some people. To lessen stress, try:  Exercising regularly.  Deep-breathing exercises.  Yoga.  Meditating.  Performing a body scan. This involves closing your eyes, scanning your body from head to toe, and noticing which parts of your body are particularly tense. Purposefully relax the muscles in those areas.  Download or purchase mobile phone or tablet apps (applications) that can help you stick to your quit plan by providing reminders, tips, and encouragement. There are many free apps, such as QuitGuide from the CDC (Centers for Disease Control and Prevention). You can find other support for quitting smoking (smoking cessation) through smokefree.gov and other websites.  How will I feel when I quit smoking?  Within the first 24 hours of quitting smoking, you may start to feel some withdrawal symptoms. These symptoms are usually most noticeable 2-3 days after quitting, but they usually do not last beyond 2-3 weeks. Changes or symptoms that you might experience include:  Mood swings.  Restlessness, anxiety, or irritation.  Difficulty concentrating.  Dizziness.  Strong cravings for sugary foods in addition to nicotine.  Mild weight gain.  Constipation.  Nausea.  Coughing or a sore throat.  Changes in how your medicines work in your body.  A depressed mood.  Difficulty sleeping (insomnia).  After the first 2-3 weeks of quitting, you may start to notice more positive results, such as:  Improved sense of smell and taste.  Decreased coughing and sore throat.  Slower heart rate.  Lower blood pressure.  Clearer skin.  The ability to breathe more easily.  Fewer sick days.  Quitting smoking is very challenging for most people. Do not get discouraged if you are not successful the first time. Some people need to make many attempts to quit before they achieve long-term success. Do your best to stick to your quit plan, and talk with your health care provider if you have any questions or concerns.  This  information is not intended to replace advice given to you by your health care provider. Make sure you discuss any questions you have with your health care provider.  Document Released: 12/12/2002 Document Revised: 08/15/2017 Document Reviewed: 05/03/2016  [employer] Patient Education © 2017 [employer]